# Patient Record
Sex: MALE | Race: WHITE | NOT HISPANIC OR LATINO | Employment: FULL TIME | URBAN - METROPOLITAN AREA
[De-identification: names, ages, dates, MRNs, and addresses within clinical notes are randomized per-mention and may not be internally consistent; named-entity substitution may affect disease eponyms.]

---

## 2017-06-02 ENCOUNTER — OFFICE VISIT (OUTPATIENT)
Dept: URGENT CARE | Facility: CLINIC | Age: 42
End: 2017-06-02
Payer: COMMERCIAL

## 2017-06-02 VITALS
HEART RATE: 82 BPM | OXYGEN SATURATION: 98 % | RESPIRATION RATE: 14 BRPM | DIASTOLIC BLOOD PRESSURE: 82 MMHG | TEMPERATURE: 98.2 F | SYSTOLIC BLOOD PRESSURE: 140 MMHG

## 2017-06-02 DIAGNOSIS — J34.89 SINUS PRESSURE: ICD-10-CM

## 2017-06-02 DIAGNOSIS — H10.13 ALLERGIC CONJUNCTIVITIS, BILATERAL: ICD-10-CM

## 2017-06-02 PROCEDURE — 99214 OFFICE O/P EST MOD 30 MIN: CPT | Performed by: PHYSICIAN ASSISTANT

## 2017-06-02 RX ORDER — AMOXICILLIN AND CLAVULANATE POTASSIUM 875; 125 MG/1; MG/1
1 TABLET, FILM COATED ORAL 2 TIMES DAILY
Qty: 14 TAB | Refills: 0 | Status: SHIPPED | OUTPATIENT
Start: 2017-06-02 | End: 2017-06-04

## 2017-06-02 RX ORDER — POLYMYXIN B SULFATE AND TRIMETHOPRIM 1; 10000 MG/ML; [USP'U]/ML
1 SOLUTION OPHTHALMIC EVERY 4 HOURS
Qty: 1 BOTTLE | Refills: 0 | Status: SHIPPED | OUTPATIENT
Start: 2017-06-02 | End: 2017-06-12

## 2017-06-02 RX ORDER — AMOXICILLIN AND CLAVULANATE POTASSIUM 875; 125 MG/1; MG/1
1 TABLET, FILM COATED ORAL 2 TIMES DAILY
Qty: 14 TAB | Refills: 0 | Status: SHIPPED | OUTPATIENT
Start: 2017-06-02 | End: 2017-06-02 | Stop reason: SDUPTHER

## 2017-06-02 ASSESSMENT — ENCOUNTER SYMPTOMS
CHILLS: 0
FEVER: 0
SINUS PRESSURE: 1
SHORTNESS OF BREATH: 0
SORE THROAT: 0
WHEEZING: 0
COUGH: 0
EYE REDNESS: 1
PALPITATIONS: 0
HEADACHES: 1

## 2017-06-02 NOTE — MR AVS SNAPSHOT
Chino Diaz   2017 8:00 AM   Office Visit   MRN: [id number]    Department:  [facility]   Dept Phone:  [phone]    Description:  Male : 1975   Provider:  Braulio Burkett PA-C           Reason for Visit     Sinus Problem head bloating/ pressure, red eyes X 5 days       Allergies as of 2017     No Known Allergies      You were diagnosed with     Sinus pressure   [888739]       Allergic conjunctivitis, bilateral   [226388]         Vital Signs     Blood Pressure Pulse Temperature Respirations Oxygen Saturation Smoking Status    140/82 mmHg 82 36.8 °C (98.2 °F) 14 98% Former Smoker      Basic Information     Date Of Birth Sex Race Ethnicity Preferred Language    1975 Male White Unknown English      Health Maintenance        Date Due Completion Dates    IMM DTaP/Tdap/Td Vaccine (1 - Tdap) 1992 ---            Current Immunizations     No immunizations on file.      Below and/or attached are the medications your provider expects you to take. Review all of your home medications and newly ordered medications with your provider and/or pharmacist. Follow medication instructions as directed by your provider and/or pharmacist. Please keep your medication list with you and share with your provider. Update the information when medications are discontinued, doses are changed, or new medications (including over-the-counter products) are added; and carry medication information at all times in the event of emergency situations     Allergies:  No Known Allergies          Medications  Valid as of: 2017 -  8:26 AM    Generic Name Brand Name Tablet Size Instructions for use    Amoxicillin-Pot Clavulanate (Tab) AUGMENTIN 875-125 MG Take 1 Tab by mouth 2 times a day for 7 days.        Gemfibrozil (Tab) LOPID 600 MG Take 600 mg by mouth 2 times a day.        Lisinopril (Tab) PRINIVIL 10 MG Take 5 mg by mouth every day.        MetFORMIN HCl (Tab) GLUCOPHAGE 500 MG Take 1,000 mg by  mouth 2 times a day, with meals.        Phenyleph-Promethazine-Cod (Syrup) PHENERGAN VC CODEINE 5-6.25-10 MG/5ML Take 5 mL by mouth every 6 hours as needed.        Polymyxin B-Trimethoprim (Solution) POLYTRIM 67054-7.1 UNIT/ML-% Place 1 Drop in both eyes every 4 hours for 10 days.        Sertraline HCl (Tab) ZOLOFT 50 MG Take 50 mg by mouth every day.        Simvastatin (Tab) ZOCOR 20 MG Take 20 mg by mouth every evening.        .                 Medicines prescribed today were sent to:     [facility][id number] - [place], [place] - [address]    [address] [place] [address]    Phone: [phone] Fax: [phone]    Open 24 Hours?: No      Medication refill instructions:       If your prescription bottle indicates you have medication refills left, it is not necessary to call your provider’s office. Please contact your pharmacy and they will refill your medication.    If your prescription bottle indicates you do not have any refills left, you may request refills at any time through one of the following ways: The online [employer] system (except Urgent Care), by calling your provider’s office, or by asking your pharmacy to contact your provider’s office with a refill request. Medication refills are processed only during regular business hours and may not be available until the next business day. Your provider may request additional information or to have a follow-up visit with you prior to refilling your medication.   *Please Note: Medication refills are assigned a new Rx number when refilled electronically. Your pharmacy may indicate that no refills were authorized even though a new prescription for the same medication is available at the pharmacy. Please request the medicine by name with the pharmacy before contacting your provider for a refill.           [employer] Access Code: [email or web address]  Expires: 7/2/2017  7:59 AM    [employer]  A secure, online tool to manage your health information          [employer]’s Canvas® is a secure, online tool that connects you to your personalized health information from the privacy of your home -- day or night - making it very easy for you to manage your healthcare. Once the activation process is completed, you can even access your medical information using the Canvas jillian, which is available for free in the Apple Jillian store or Google Play store.     Canvas provides the following levels of access (as shown below):   My Chart Features   [facility] Primary [facility] Doctor [facility]  Specialists [facility]  Urgent  Care Non-[facility]  Primary Care  Doctor   Email your healthcare team securely and privately 24/7 X X X    Manage appointments: schedule your next appointment; view details of past/upcoming appointments X      Request prescription refills. X      View recent personal medical records, including lab and immunizations X X X X   View health record, including health history, allergies, medications X X X X   Read reports about your outpatient visits, procedures, consult and ER notes X X X X   See your discharge summary, which is a recap of your hospital and/or ER visit that includes your diagnosis, lab results, and care plan. X X       How to register for Canvas:  1. Go to  [email or web address].  2. Click on the Sign Up Now box, which takes you to the New Member Sign Up page. You will need to provide the following information:  a. Enter your Canvas Access Code exactly as it appears at the top of this page. (You will not need to use this code after you’ve completed the sign-up process. If you do not sign up before the expiration date, you must request a new code.)   b. Enter your date of birth.   c. Enter your home email address.   d. Click Submit, and follow the next screen’s instructions.  3. Create a Canvas ID. This will be your Canvas login ID and cannot be changed, so think of one that is secure and easy to remember.  4. Create a Canvas password. You can change your  password at any time.  5. Enter your Password Reset Question and Answer. This can be used at a later time if you forget your password.   6. Enter your e-mail address. This allows you to receive e-mail notifications when new information is available in [employer].  7. Click Sign Up. You can now view your health information.    For assistance activating your [employer] account, call [phone]

## 2017-06-02 NOTE — PROGRESS NOTES
Subjective:      Chino Diaz is a 41 y.o. male who presents with Sinus Problem            Sinus Problem  This is a new problem. Episode onset: 5 days ago. The problem has been gradually worsening since onset. There has been no fever. The fever has been present for less than 1 day. The pain is moderate. Associated symptoms include congestion, headaches and sinus pressure. Pertinent negatives include no chills, coughing, ear pain, shortness of breath or sore throat. Past treatments include oral decongestants and saline sprays. The treatment provided mild relief.       Review of Systems   Constitutional: Negative for fever and chills.   HENT: Positive for congestion and sinus pressure. Negative for ear pain and sore throat.    Eyes: Positive for redness.   Respiratory: Negative for cough, shortness of breath and wheezing.    Cardiovascular: Negative for chest pain and palpitations.   Neurological: Positive for headaches.     All other systems reviewed and are negative.  PMH:  has a past medical history of Type II or unspecified type diabetes mellitus without mention of complication, not stated as uncontrolled; Depression; Hypertension; and Hyperlipidemia.  MEDS:   Current outpatient prescriptions:   •  polymixin-trimethoprim (POLYTRIM) 64552-0.1 UNIT/ML-% Solution, Place 1 Drop in both eyes every 4 hours for 10 days., Disp: 1 Bottle, Rfl: 0  •  amoxicillin-clavulanate (AUGMENTIN) 875-125 MG Tab, Take 1 Tab by mouth 2 times a day for 7 days., Disp: 14 Tab, Rfl: 0  •  sertraline (ZOLOFT) 50 MG TABS, Take 50 mg by mouth every day., Disp: , Rfl:   •  metformin (GLUCOPHAGE) 500 MG TABS, Take 1,000 mg by mouth 2 times a day, with meals., Disp: , Rfl:   •  simvastatin (ZOCOR) 20 MG TABS, Take 20 mg by mouth every evening., Disp: , Rfl:   •  gemfibrozil (LOPID) 600 MG TABS, Take 600 mg by mouth 2 times a day., Disp: , Rfl:   •  prometh-phenylephrine-codeine (PHENERGAN VC CODEINE) 5-6.25-10 MG/5ML SYRP, Take 5 mL by mouth  every 6 hours as needed., Disp: 150 mL, Rfl: 0  •  lisinopril (PRINIVIL) 10 MG TABS, Take 5 mg by mouth every day., Disp: , Rfl:   ALLERGIES: No Known Allergies  SURGHX:   Past Surgical History   Procedure Laterality Date   • Tonsillectomy       SOCHX:  reports that he quit smoking about 11 years ago. His smoking use included Cigarettes. He does not have any smokeless tobacco history on file. He reports that he drinks alcohol. He reports that he does not use illicit drugs.  FH: Family history was reviewed, no pertinent findings to report  Medications, Allergies, and current problem list reviewed today in Epic         Objective:     /82 mmHg  Pulse 82  Temp(Src) 36.8 °C (98.2 °F)  Resp 14  SpO2 98%     Physical Exam   Constitutional: He is oriented to person, place, and time. Vital signs are normal. He appears well-developed and well-nourished.   HENT:   Head: Normocephalic and atraumatic.   Right Ear: Hearing, tympanic membrane, external ear and ear canal normal.   Left Ear: Hearing, tympanic membrane, external ear and ear canal normal.   Nose: Mucosal edema and rhinorrhea present. No sinus tenderness. No epistaxis. Right sinus exhibits no maxillary sinus tenderness. Left sinus exhibits no maxillary sinus tenderness.   Mouth/Throat: Uvula is midline, oropharynx is clear and moist and mucous membranes are normal.   Neck: Normal range of motion. Neck supple.   Cardiovascular: Normal rate, regular rhythm, normal heart sounds and intact distal pulses.    Pulmonary/Chest: Effort normal and breath sounds normal.   Neurological: He is alert and oriented to person, place, and time.   Skin: Skin is warm and dry.   Psychiatric: He has a normal mood and affect. His behavior is normal.   Vitals reviewed.              Assessment/Plan:   Sinus pressure most likely due to allergy.    1. Sinus pressure  polymixin-trimethoprim (POLYTRIM) 31255-5.1 UNIT/ML-% Solution    amoxicillin-clavulanate (AUGMENTIN) 875-125 MG Tab     DISCONTINUED: amoxicillin-clavulanate (AUGMENTIN) 875-125 MG Tab   2. Allergic conjunctivitis, bilateral       Cont.  Allergy OTC    Contingent antibiotics prescription given to patient to fill upon meeting criteria of guidelines discussed.     Differential diagnosis, natural history, supportive care, and indications for immediate follow-up discussed at length.   Follow-up with primary care provider within 4-5 days, emergency room precautions discussed.  Patient and/or family appears understanding of information.

## 2017-06-04 ENCOUNTER — APPOINTMENT (OUTPATIENT)
Dept: RADIOLOGY | Facility: MEDICAL CENTER | Age: 42
End: 2017-06-04
Attending: EMERGENCY MEDICINE
Payer: COMMERCIAL

## 2017-06-04 ENCOUNTER — OFFICE VISIT (OUTPATIENT)
Dept: URGENT CARE | Facility: CLINIC | Age: 42
End: 2017-06-04
Payer: COMMERCIAL

## 2017-06-04 ENCOUNTER — HOSPITAL ENCOUNTER (EMERGENCY)
Facility: MEDICAL CENTER | Age: 42
End: 2017-06-04
Attending: EMERGENCY MEDICINE
Payer: COMMERCIAL

## 2017-06-04 VITALS
BODY MASS INDEX: 25.56 KG/M2 | DIASTOLIC BLOOD PRESSURE: 90 MMHG | HEART RATE: 91 BPM | HEIGHT: 70 IN | RESPIRATION RATE: 16 BRPM | SYSTOLIC BLOOD PRESSURE: 155 MMHG | WEIGHT: 178.57 LBS | TEMPERATURE: 97.6 F | OXYGEN SATURATION: 98 %

## 2017-06-04 VITALS
HEIGHT: 70 IN | DIASTOLIC BLOOD PRESSURE: 86 MMHG | SYSTOLIC BLOOD PRESSURE: 130 MMHG | WEIGHT: 178 LBS | OXYGEN SATURATION: 96 % | RESPIRATION RATE: 20 BRPM | BODY MASS INDEX: 25.48 KG/M2 | TEMPERATURE: 96.3 F | HEART RATE: 88 BPM

## 2017-06-04 DIAGNOSIS — J34.89 SINUS PRESSURE: ICD-10-CM

## 2017-06-04 DIAGNOSIS — J30.89 NON-SEASONAL ALLERGIC RHINITIS, UNSPECIFIED ALLERGIC RHINITIS TRIGGER: ICD-10-CM

## 2017-06-04 DIAGNOSIS — G51.0 BELL'S PALSY: ICD-10-CM

## 2017-06-04 DIAGNOSIS — R11.2 NON-INTRACTABLE VOMITING WITH NAUSEA, UNSPECIFIED VOMITING TYPE: ICD-10-CM

## 2017-06-04 DIAGNOSIS — G44.209 ACUTE NON INTRACTABLE TENSION-TYPE HEADACHE: ICD-10-CM

## 2017-06-04 DIAGNOSIS — R73.9 HYPERGLYCEMIA: ICD-10-CM

## 2017-06-04 LAB
ALBUMIN SERPL BCP-MCNC: 5 G/DL (ref 3.2–4.9)
ALBUMIN/GLOB SERPL: 1.6 G/DL
ALP SERPL-CCNC: 112 U/L (ref 30–99)
ALT SERPL-CCNC: 28 U/L (ref 2–50)
ANION GAP SERPL CALC-SCNC: 22 MMOL/L (ref 0–11.9)
AST SERPL-CCNC: 13 U/L (ref 12–45)
BASOPHILS # BLD AUTO: 1 % (ref 0–1.8)
BASOPHILS # BLD: 0.15 K/UL (ref 0–0.12)
BILIRUB SERPL-MCNC: 0.6 MG/DL (ref 0.1–1.5)
BUN SERPL-MCNC: 18 MG/DL (ref 8–22)
CALCIUM SERPL-MCNC: 11 MG/DL (ref 8.5–10.5)
CHLORIDE SERPL-SCNC: 103 MMOL/L (ref 96–112)
CO2 SERPL-SCNC: 11 MMOL/L (ref 20–33)
CREAT SERPL-MCNC: 1.1 MG/DL (ref 0.5–1.4)
EOSINOPHIL # BLD AUTO: 0.02 K/UL (ref 0–0.51)
EOSINOPHIL NFR BLD: 0.1 % (ref 0–6.9)
ERYTHROCYTE [DISTWIDTH] IN BLOOD BY AUTOMATED COUNT: 50.1 FL (ref 35.9–50)
GFR SERPL CREATININE-BSD FRML MDRD: >60 ML/MIN/1.73 M 2
GLOBULIN SER CALC-MCNC: 3.1 G/DL (ref 1.9–3.5)
GLUCOSE BLD-MCNC: 264 MG/DL (ref 65–99)
GLUCOSE BLD-MCNC: 311 MG/DL (ref 70–100)
GLUCOSE BLD-MCNC: 339 MG/DL (ref 65–99)
GLUCOSE SERPL-MCNC: 364 MG/DL (ref 65–99)
HCT VFR BLD AUTO: 53.8 % (ref 42–52)
HGB BLD-MCNC: 18.3 G/DL (ref 14–18)
IMM GRANULOCYTES # BLD AUTO: 0.21 K/UL (ref 0–0.11)
IMM GRANULOCYTES NFR BLD AUTO: 1.4 % (ref 0–0.9)
LIPASE SERPL-CCNC: 35 U/L (ref 11–82)
LYMPHOCYTES # BLD AUTO: 1.59 K/UL (ref 1–4.8)
LYMPHOCYTES NFR BLD: 10.7 % (ref 22–41)
MCH RBC QN AUTO: 31.1 PG (ref 27–33)
MCHC RBC AUTO-ENTMCNC: 34 G/DL (ref 33.7–35.3)
MCV RBC AUTO: 91.5 FL (ref 81.4–97.8)
MONOCYTES # BLD AUTO: 0.6 K/UL (ref 0–0.85)
MONOCYTES NFR BLD AUTO: 4 % (ref 0–13.4)
NEUTROPHILS # BLD AUTO: 12.3 K/UL (ref 1.82–7.42)
NEUTROPHILS NFR BLD: 82.8 % (ref 44–72)
NRBC # BLD AUTO: 0 K/UL
NRBC BLD AUTO-RTO: 0 /100 WBC
PLATELET # BLD AUTO: 443 K/UL (ref 164–446)
PMV BLD AUTO: 9.3 FL (ref 9–12.9)
POTASSIUM SERPL-SCNC: 4.9 MMOL/L (ref 3.6–5.5)
PROT SERPL-MCNC: 8.1 G/DL (ref 6–8.2)
RBC # BLD AUTO: 5.88 M/UL (ref 4.7–6.1)
SODIUM SERPL-SCNC: 136 MMOL/L (ref 135–145)
WBC # BLD AUTO: 14.9 K/UL (ref 4.8–10.8)

## 2017-06-04 PROCEDURE — 700111 HCHG RX REV CODE 636 W/ 250 OVERRIDE (IP): Performed by: EMERGENCY MEDICINE

## 2017-06-04 PROCEDURE — 700102 HCHG RX REV CODE 250 W/ 637 OVERRIDE(OP): Performed by: EMERGENCY MEDICINE

## 2017-06-04 PROCEDURE — 96365 THER/PROPH/DIAG IV INF INIT: CPT

## 2017-06-04 PROCEDURE — 96361 HYDRATE IV INFUSION ADD-ON: CPT

## 2017-06-04 PROCEDURE — 70450 CT HEAD/BRAIN W/O DYE: CPT

## 2017-06-04 PROCEDURE — 99215 OFFICE O/P EST HI 40 MIN: CPT | Performed by: NURSE PRACTITIONER

## 2017-06-04 PROCEDURE — 83690 ASSAY OF LIPASE: CPT

## 2017-06-04 PROCEDURE — 99285 EMERGENCY DEPT VISIT HI MDM: CPT

## 2017-06-04 PROCEDURE — 700105 HCHG RX REV CODE 258: Performed by: EMERGENCY MEDICINE

## 2017-06-04 PROCEDURE — 96375 TX/PRO/DX INJ NEW DRUG ADDON: CPT

## 2017-06-04 PROCEDURE — 80053 COMPREHEN METABOLIC PANEL: CPT

## 2017-06-04 PROCEDURE — 96372 THER/PROPH/DIAG INJ SC/IM: CPT

## 2017-06-04 PROCEDURE — 85025 COMPLETE CBC W/AUTO DIFF WBC: CPT

## 2017-06-04 PROCEDURE — A9270 NON-COVERED ITEM OR SERVICE: HCPCS | Performed by: EMERGENCY MEDICINE

## 2017-06-04 PROCEDURE — 82962 GLUCOSE BLOOD TEST: CPT

## 2017-06-04 RX ORDER — KETOROLAC TROMETHAMINE 30 MG/ML
60 INJECTION, SOLUTION INTRAMUSCULAR; INTRAVENOUS ONCE
Status: CANCELLED | OUTPATIENT
Start: 2017-06-04 | End: 2017-06-04

## 2017-06-04 RX ORDER — DEXAMETHASONE SODIUM PHOSPHATE 4 MG/ML
10 INJECTION, SOLUTION INTRA-ARTICULAR; INTRALESIONAL; INTRAMUSCULAR; INTRAVENOUS; SOFT TISSUE ONCE
Status: COMPLETED | OUTPATIENT
Start: 2017-06-04 | End: 2017-06-04

## 2017-06-04 RX ORDER — ONDANSETRON 4 MG/1
4 TABLET, ORALLY DISINTEGRATING ORAL EVERY 8 HOURS PRN
Qty: 10 TAB | Refills: 0 | Status: CANCELLED | OUTPATIENT
Start: 2017-06-04

## 2017-06-04 RX ORDER — ACYCLOVIR 800 MG/1
800 TABLET ORAL ONCE
Status: COMPLETED | OUTPATIENT
Start: 2017-06-04 | End: 2017-06-04

## 2017-06-04 RX ORDER — ONDANSETRON 2 MG/ML
4 INJECTION INTRAMUSCULAR; INTRAVENOUS ONCE
Status: COMPLETED | OUTPATIENT
Start: 2017-06-04 | End: 2017-06-04

## 2017-06-04 RX ORDER — PROMETHAZINE HYDROCHLORIDE 25 MG/1
25 TABLET ORAL EVERY 6 HOURS PRN
Qty: 16 TAB | Refills: 0 | Status: SHIPPED | OUTPATIENT
Start: 2017-06-04 | End: 2017-12-26

## 2017-06-04 RX ORDER — ONDANSETRON 4 MG/1
4 TABLET, ORALLY DISINTEGRATING ORAL ONCE
Status: CANCELLED | OUTPATIENT
Start: 2017-06-04 | End: 2017-06-04

## 2017-06-04 RX ORDER — VALACYCLOVIR HYDROCHLORIDE 500 MG/1
1000 TABLET, FILM COATED ORAL 2 TIMES DAILY
Qty: 20 TAB | Refills: 0 | Status: SHIPPED | OUTPATIENT
Start: 2017-06-04 | End: 2017-06-09

## 2017-06-04 RX ORDER — MINERAL OIL, PETROLATUM 425; 568 MG/G; MG/G
1 OINTMENT OPHTHALMIC
Qty: 1 TUBE | Refills: 1 | Status: SHIPPED | OUTPATIENT
Start: 2017-06-04 | End: 2019-03-12

## 2017-06-04 RX ORDER — AMOXICILLIN 500 MG/1
500 CAPSULE ORAL 3 TIMES DAILY
Qty: 30 CAP | Refills: 0 | Status: SHIPPED | OUTPATIENT
Start: 2017-06-04 | End: 2017-12-26

## 2017-06-04 RX ORDER — SODIUM CHLORIDE 9 MG/ML
2000 INJECTION, SOLUTION INTRAVENOUS ONCE
Status: COMPLETED | OUTPATIENT
Start: 2017-06-04 | End: 2017-06-04

## 2017-06-04 RX ORDER — CEFTRIAXONE 2 G/1
2 INJECTION, POWDER, FOR SOLUTION INTRAMUSCULAR; INTRAVENOUS ONCE
Status: COMPLETED | OUTPATIENT
Start: 2017-06-04 | End: 2017-06-04

## 2017-06-04 RX ORDER — PREDNISONE 20 MG/1
40 TABLET ORAL DAILY
Qty: 6 TAB | Refills: 0 | Status: SHIPPED | OUTPATIENT
Start: 2017-06-04 | End: 2017-06-07

## 2017-06-04 RX ORDER — LISINOPRIL 10 MG/1
10 TABLET ORAL DAILY
Qty: 30 TAB | Refills: 0 | Status: SHIPPED | OUTPATIENT
Start: 2017-06-04 | End: 2018-01-02 | Stop reason: SDUPTHER

## 2017-06-04 RX ADMIN — ACYCLOVIR 800 MG: 800 TABLET ORAL at 15:28

## 2017-06-04 RX ADMIN — CEFTRIAXONE 2 G: 2 INJECTION, POWDER, FOR SOLUTION INTRAMUSCULAR; INTRAVENOUS at 15:14

## 2017-06-04 RX ADMIN — DEXAMETHASONE SODIUM PHOSPHATE 10 MG: 4 INJECTION, SOLUTION INTRAMUSCULAR; INTRAVENOUS at 15:28

## 2017-06-04 RX ADMIN — INSULIN HUMAN 5 UNITS: 100 INJECTION, SOLUTION PARENTERAL at 13:19

## 2017-06-04 RX ADMIN — SODIUM CHLORIDE 2000 ML: 9 INJECTION, SOLUTION INTRAVENOUS at 13:19

## 2017-06-04 RX ADMIN — ONDANSETRON 4 MG: 2 INJECTION INTRAMUSCULAR; INTRAVENOUS at 13:19

## 2017-06-04 ASSESSMENT — ENCOUNTER SYMPTOMS
ABDOMINAL PAIN: 0
PALPITATIONS: 0
FOCAL WEAKNESS: 0
WEAKNESS: 0
PHOTOPHOBIA: 0
EYE DISCHARGE: 0
SORE THROAT: 0
ORTHOPNEA: 0
DIZZINESS: 0
DOUBLE VISION: 0
SENSORY CHANGE: 0
MYALGIAS: 0
WHEEZING: 0
DIARRHEA: 0
EYE REDNESS: 0
NUMBER OF EPISODES OF EMESIS TODAY: 1
NAUSEA: 1
FEVER: 0
BLURRED VISION: 0
SPEECH CHANGE: 0
HEADACHES: 1
CHILLS: 0
SHORTNESS OF BREATH: 0
COUGH: 0
CONSTIPATION: 0
VOMITING: 1
TINGLING: 0

## 2017-06-04 ASSESSMENT — PAIN SCALES - GENERAL: PAINLEVEL_OUTOF10: 7

## 2017-06-04 NOTE — ED PROVIDER NOTES
ER Provider Note     Scribed for Ayo Kelley M.D. by Carley Gabriel. 6/4/2017, 12:13 PM.    Primary Care Provider: Regi Nolan M.D.  Means of Arrival: Walk in   History obtained from: Patient  History limited by: None     CHIEF COMPLAINT   Chief Complaint   Patient presents with   • Headache       HPI   Chino Diaz is a 41 y.o. who presents to the emergency department for a headache with an onset of 4 days.  Symptoms developed six days ago with photophobia and sinus pressure.  He was placed on Augmentin after he was evaluated on 06/02/17.  He experienced a sinus headache four days ago which was associated with photophobia, eye redness and sinus pressure.  Headache has remained constant since onset.  He was evaluated at an urgent care for this complaint and was asked to continue use of Claritin, Sinex nasal spray and Visine eye drops.  He began vomiting yesterday and reports multiple episodes.  He is able to tolerate fluids.  He was evaluated in the urgent care today and was noted to have hyperglycemia at 311. History of diabetes and hypertension.  Patient has been noncompliant for two months with all of his medications including Lisinopril and Metformin secondary to insurance issues.  Associated symptoms include nausea.  He denies fevers, ear pain, vision changes, cough, shortness of breath, abdominal pain, diarrhea or focal weakness.       REVIEW OF SYSTEMS   General: No fever or chills.  Eyes: Positive photophobia and eye redness.  No vision changes.  Ear nose throat: Positive sinus pressure.  No sore throat or  trouble swallowing. No ear pain, runny nose or congestion.  Pulmonary: No shortness of breath or cough.  Cardiovascular: No chest pain or chest pressure.  GI: Positive nausea and vomiting.  No abdominal pain or diarrhea.  : No dysuria or hematuria  Dermatologic: No rashes. No abrasions.  Neurologic: Positive sinus headache.  No focal weakness.  Psychiatric: No anxiety or  "stress.  Endocrine:  Positive hyperglycemia.    All other systems reviewed and are negative.  C.      PAST MEDICAL HISTORY  Past Medical History   Diagnosis Date   • Type II or unspecified type diabetes mellitus without mention of complication, not stated as uncontrolled    • Depression    • Hypertension    • Hyperlipidemia        SURGICAL HISTORY  Past Surgical History   Procedure Laterality Date   • Tonsillectomy         SOCIAL HISTORY  Social History   Substance Use Topics   • Smoking status: Former Smoker     Types: Cigarettes     Quit date: 01/01/2006   • Smokeless tobacco: None   • Alcohol Use: Yes      Comment: 1-2x weekly       CURRENT MEDICATIONS  Previous Medications    GEMFIBROZIL (LOPID) 600 MG TABS    Take 600 mg by mouth 2 times a day.    LISINOPRIL (PRINIVIL) 10 MG TABS    Take 5 mg by mouth every day.    METFORMIN (GLUCOPHAGE) 500 MG TABS    Take 1,000 mg by mouth 2 times a day, with meals.    POLYMIXIN-TRIMETHOPRIM (POLYTRIM) 67618-5.1 UNIT/ML-% SOLUTION    Place 1 Drop in both eyes every 4 hours for 10 days.    PROMETH-PHENYLEPHRINE-CODEINE (PHENERGAN VC CODEINE) 5-6.25-10 MG/5ML SYRP    Take 5 mL by mouth every 6 hours as needed.    SERTRALINE (ZOLOFT) 50 MG TABS    Take 50 mg by mouth every day.    SIMVASTATIN (ZOCOR) 20 MG TABS    Take 20 mg by mouth every evening.   Patient reports use of Augmentin, Claritin, Sinex nasal spray and Visine eye drops.  Patient has been noncompliant for two months with all of his medications including Lisinopril and Metformin.      ALLERGIES   None      PHYSICAL EXAM   Vital Signs: /90 mmHg  Pulse 116  Temp(Src) 36.4 °C (97.6 °F)  Resp 16  Ht 1.778 m (5' 10\")  Wt 81 kg (178 lb 9.2 oz)  BMI 25.62 kg/m2  SpO2 97%      Constitutional: Well developed, Well nourished, No acute distress, Non-toxic appearance.   Psychiatric: Calm. Not anxious.  HENT:  Oropharynx: no exudate no erythema  Eyes: PERRLA, EOMI, Conjunctiva normal, No discharge.   Musculoskeletal: " Neck is soft and supple no meningismus  Lymphatic: No cervical lymphadenopathy noted.   Cardiovascular: Normal heart rate, Normal rhythm, No murmurs, Negative Homans, no pedal edema, good equal pedal pulses.  Pulmonary: Lungs are clear to auscultation bilaterally. No wheezes rales or rhonchi  Abdomen: Bowel sounds normal, soft nondistended and nontender. No rebound and no guarding .  Skin: Warm, Dry, No erythema, No rash.   : No CVA tenderness.   Neurologic: Alert & oriented, moves all extremities normally. Good sensation to light touch on all extremities. Cranial nerves II-XII are grossly intact except cranial nerve VIII with facial palsy on left side with forehead involved.        DIAGNOSTIC STUDIES/PROCEDURES  Labs:   Results for orders placed or performed during the hospital encounter of 06/04/17   CBC WITH DIFFERENTIAL   Result Value Ref Range    WBC 14.9 (H) 4.8 - 10.8 K/uL    RBC 5.88 4.70 - 6.10 M/uL    Hemoglobin 18.3 (H) 14.0 - 18.0 g/dL    Hematocrit 53.8 (H) 42.0 - 52.0 %    MCV 91.5 81.4 - 97.8 fL    MCH 31.1 27.0 - 33.0 pg    MCHC 34.0 33.7 - 35.3 g/dL    RDW 50.1 (H) 35.9 - 50.0 fL    Platelet Count 443 164 - 446 K/uL    MPV 9.3 9.0 - 12.9 fL    Neutrophils-Polys 82.80 (H) 44.00 - 72.00 %    Lymphocytes 10.70 (L) 22.00 - 41.00 %    Monocytes 4.00 0.00 - 13.40 %    Eosinophils 0.10 0.00 - 6.90 %    Basophils 1.00 0.00 - 1.80 %    Immature Granulocytes 1.40 (H) 0.00 - 0.90 %    Nucleated RBC 0.00 /100 WBC    Neutrophils (Absolute) 12.30 (H) 1.82 - 7.42 K/uL    Lymphs (Absolute) 1.59 1.00 - 4.80 K/uL    Monos (Absolute) 0.60 0.00 - 0.85 K/uL    Eos (Absolute) 0.02 0.00 - 0.51 K/uL    Baso (Absolute) 0.15 (H) 0.00 - 0.12 K/uL    Immature Granulocytes (abs) 0.21 (H) 0.00 - 0.11 K/uL    NRBC (Absolute) 0.00 K/uL   COMP METABOLIC PANEL   Result Value Ref Range    Potassium 4.9 3.6 - 5.5 mmol/L    Sodium 136 135 - 145 mmol/L    Chloride 103 96 - 112 mmol/L    Co2 11 (L) 20 - 33 mmol/L    Anion Gap 22.0 (H)  0.0 - 11.9    Glucose 364 (H) 65 - 99 mg/dL    Bun 18 8 - 22 mg/dL    Creatinine 1.10 0.50 - 1.40 mg/dL    Calcium 11.0 (H) 8.5 - 10.5 mg/dL    AST(SGOT) 13 12 - 45 U/L    ALT(SGPT) 28 2 - 50 U/L    Alkaline Phosphatase 112 (H) 30 - 99 U/L    Total Bilirubin 0.6 0.1 - 1.5 mg/dL    Albumin 5.0 (H) 3.2 - 4.9 g/dL    Total Protein 8.1 6.0 - 8.2 g/dL    Globulin 3.1 1.9 - 3.5 g/dL    A-G Ratio 1.6 g/dL   LIPASE   Result Value Ref Range    Lipase 35 11 - 82 U/L   ESTIMATED GFR   Result Value Ref Range    GFR If African American >60 >60 mL/min/1.73 m 2    GFR If Non African American >60 >60 mL/min/1.73 m 2   ACCU-CHEK GLUCOSE   Result Value Ref Range    Glucose - Accu-Ck 339 (H) 65 - 99 mg/dL   ACCU-CHEK GLUCOSE   Result Value Ref Range    Glucose - Accu-Ck 264 (H) 65 - 99 mg/dL      All labs reviewed by me.        Radiology:   CT-HEAD W/O   Final Result      No acute intracranial abnormality is identified.      Trace fluid in the mastoid air cells on the left.        The radiologist's interpretation of all radiological studies have been reviewed by me.      COURSE & MEDICAL DECISION MAKING   Pertinent Labs & Imaging studies reviewed. (See chart for details). The patient is here with obvious Bell's palsy it's interesting that he does not recall it sounds like he had a recent viral sinusitis however being a diabetic we need to worry about bacterial infection. He is also been noncompliant as medications.    12:18 PM Reviewed patient's electronic medical record which indicates an office visit on 06/02/17 for sinus problem and was placed on Augmentin.  Patient was evaluated earlier today for congestion, headache, nausea and vomiting.  POC glucose was noted at 311.  Patient denies taking oral diabetic medication for 3 months due to financial and insurance problems.    12:21 PM Patient seen and examined at bedside. Patient presents for a headache.  Exam indicates a normal neurological exam with the exception of cranial nerve  VIII with facial palsy on left side with forehead involved.      Initial orders in the Emergency Department included CT head and laboratory testing: lipase, CBC with differential, CMP, estimated GFR and Accu-Chek glucose.  Initial treatment in the Emergency Department included 5 units of Humulin R subcutaneous and 4 mg of Zofran IV.  Patient will be hydrated with 2 L of NS IV.  Patient verbalized their understanding and agreement to this plan.    3:06 PM Accu-chek glucose was ordered and resulted at 264.    3:09 PM Patient will be treated with 10 mg of Decadron IV, 800 mg of Acyclovir IV and 2 g of Rocephin IV.    4:03 PM On repeat evaluation, patient is doing well.     4:36 PM CT results and lab results were relayed to the patient as noted above.  Eye patch will be given to him prior to discharge.      Leukocyte is improved to the 200s. Patient asked he feels much better has not been vomiting. At this point received IV fluids due to dehydration and vomiting and tolerated well and is responded well with moist oral mucosa and no longer vomiting. Patient at this point be discharged home on putting on amoxicillin for the possible mastoid fluid and the patient may have some residual infection I think would benefit from antibiotics in addition we'll put him on Decadron will put him back on his metformin in addition his blood pressure medication he will follow-up with his primary care doctor when he establishes long he will also return ER if he is not getting better in a couple days or gets worse. We talked about most importantly about keeping the eye covered or moist with drops or Lacri-Lube or eye guard as he may, if not properly followed, could develop corneal abrasion corneal ulcer and loss of vision. Patient verbalized understanding all these parts.    Discharge plan was discussed with the patient and includes following up with Dr. Nolan.  Patient will be discharged with a prescription for Phenergan, Deltasone,  "Valtrex, Amoxil, Lacri-lube, Tears Naturale Free, Metformin and Lisinopril.  Patient is aware that both the Lisinopril and Metformin prescriptions were sent to his pharmacy.       The patient will return for new or persisting symptoms including eye pain, weakness or headache.  The patient verbalizes understanding and will comply.  Patient is stable at the time of discharge.  Vital signs were reviewed: /90 mmHg  Pulse 95  Temp(Src) 36.4 °C (97.6 °F)  Resp 16  Ht 1.778 m (5' 10\")  Wt 81 kg (178 lb 9.2 oz)  BMI 25.62 kg/m2  SpO2 97%       DISPOSITION  Patient will be discharged home in stable condition.      FOLLOW UP  Regi Nolan M.D.  [address] [place] [address] [address]  [facility] [address]  [phone]      [facility], Emergency Dept  [address] [facility] [address] [phone]    If symptoms worsen      The patient is referred to a primary physician for blood pressure management, diabetic screening, and for all other preventative health concerns.      OUTPATIENT MEDICATIONS  New Prescriptions    AMOXICILLIN (AMOXIL) 500 MG CAP    Take 1 Cap by mouth 3 times a day.    ARTIFICIAL TEAR SOLUTION (TEARS NATURALE FREE) 0.1-0.3 % SOLUTION    Apply in the left eye every 1-2 drops while awake    ARTIFICIAL TEARS (LACRI-LUBE) OINTMENT OPHTHALMIC OINTMENT    Place 1 Inch in left eye every bedtime. For 3 weeks    LISINOPRIL (PRINIVIL) 10 MG TAB    Take 1 Tab by mouth every day.    METFORMIN (GLUCOPHAGE) 1000 MG TABLET    Take 1 Tab by mouth 2 times a day, with meals.    PREDNISONE (DELTASONE) 20 MG TAB    Take 2 Tabs by mouth every day for 3 days.    PROMETHAZINE (PHENERGAN) 25 MG TAB    Take 1 Tab by mouth every 6 hours as needed for Nausea/Vomiting.    VALACYCLOVIR (VALTREX) 500 MG TAB    Take 2 Tabs by mouth 2 times a day for 5 days.       DIAGNOSIS  1. Hyperglycemia    2. Bell's palsy     3. Med refill  The note accurately reflects work and decisions made by me.  Ayo Kelley  " 6/4/2017  6:51 PM     ICarley (Scribe), am scribing for, and in the presence of, Ayo Kelley M.D.    Electronically signed by: Carley Gabriel (Scribe), 6/4/2017    Ayo VEE M.D. personally performed the services described in this documentation, as scribed by Carley Gabriel in my presence, and it is both accurate and complete.

## 2017-06-04 NOTE — ED AVS SNAPSHOT
Home Care Instructions                                                                                                                Chino Diaz   MRN: [id number]    Department:  [facility], Emergency Dept   Date of Visit:  6/4/2017            [facility], Emergency Dept    [address] [address]    Phone:  [phone]      You were seen by     Ayo Kelley M.D.      Your Diagnosis Was     Hyperglycemia     R73.9       These are the medications you received during your hospitalization from 06/04/2017 1112 to 06/04/2017 1633     Date/Time Order Dose Route Action    06/04/2017 1319 NS infusion 2,000 mL 2,000 mL Intravenous New Bag    06/04/2017 1319 ondansetron (ZOFRAN) syringe/vial injection 4 mg 4 mg Intravenous Given    06/04/2017 1319 insulin regular (HUMULIN R) injection 5 Units 5 Units Subcutaneous Given    06/04/2017 1528 dexamethasone (DECADRON) injection 10 mg 10 mg Intravenous Given    06/04/2017 1528 acyclovir (ZOVIRAX) tablet 800 mg 800 mg Oral Given    06/04/2017 1514 cefTRIAXone (ROCEPHIN) injection 2 g 2 g Intravenous Given      Follow-up Information     1. Follow up with Regi Nolan M.D..    Specialty:  Internal Medicine    Contact information    [address]  [facility] [address] [phone]          2. Follow up with [facility], Emergency Dept.    Specialty:  Emergency Medicine    Why:  If symptoms worsen    Contact information    [address] [place] [address] [phone]      Medication Information     Review all of your home medications and newly ordered medications with your primary doctor and/or pharmacist as soon as possible. Follow medication instructions as directed by your doctor and/or pharmacist.     Please keep your complete medication list with you and share with your physician. Update the information when medications are discontinued, doses are changed, or new medications  (including over-the-counter products) are added; and carry medication information at all times in the event of emergency situations.               Medication List      START taking these medications        Instructions    Morning Afternoon Evening Bedtime    amoxicillin 500 MG Caps   Commonly known as:  AMOXIL        Take 1 Cap by mouth 3 times a day.   Dose:  500 mg                        artificial tears Oint ophthalmic ointment        Place 1 Inch in left eye every bedtime. For 3 weeks   Dose:  1 Inch                        predniSONE 20 MG Tabs   Commonly known as:  DELTASONE        Take 2 Tabs by mouth every day for 3 days.   Dose:  40 mg                        TEARS NATURALE FREE 0.1-0.3 % Soln        Apply in the left eye every 1-2 drops while awake                        valacyclovir 500 MG Tabs   Commonly known as:  VALTREX        Take 2 Tabs by mouth 2 times a day for 5 days.   Dose:  1000 mg                          ASK your doctor about these medications        Instructions    Morning Afternoon Evening Bedtime    * lisinopril 10 MG Tabs   What changed:  You were already taking a medication with the same name, and this prescription was added. Make sure you understand how and when to take each.   Commonly known as:  PRINIVIL   Ask about: Which instructions should I use?        Take 1 Tab by mouth every day.   Dose:  10 mg                        * lisinopril 10 MG Tabs   What changed:  Another medication with the same name was added. Make sure you understand how and when to take each.   Commonly known as:  PRINIVIL   Ask about: Which instructions should I use?        Take 5 mg by mouth every day.   Dose:  5 mg                        LOPID 600 MG Tabs   Generic drug:  gemfibrozil        Take 600 mg by mouth 2 times a day.   Dose:  600 mg                        * metformin 1000 MG tablet   What changed:  You were already taking a medication with the same name, and this prescription was added. Make sure you  understand how and when to take each.   Commonly known as:  GLUCOPHAGE   Ask about: Which instructions should I use?        Take 1 Tab by mouth 2 times a day, with meals.   Dose:  1000 mg                        * metformin 500 MG Tabs   What changed:  Another medication with the same name was added. Make sure you understand how and when to take each.   Commonly known as:  GLUCOPHAGE   Ask about: Which instructions should I use?        Take 1,000 mg by mouth 2 times a day, with meals.   Dose:  1000 mg                        polymixin-trimethoprim 74881-3.1 UNIT/ML-% Soln   Commonly known as:  POLYTRIM        Place 1 Drop in both eyes every 4 hours for 10 days.   Dose:  1 Drop                        prometh-phenylephrine-codeine 5-6.25-10 MG/5ML Syrp   Commonly known as:  PHENERGAN VC CODEINE        Take 5 mL by mouth every 6 hours as needed.   Dose:  5 mL                        simvastatin 20 MG Tabs   Commonly known as:  ZOCOR        Take 20 mg by mouth every evening.   Dose:  20 mg                        ZOLOFT 50 MG Tabs   Generic drug:  sertraline        Take 50 mg by mouth every day.   Dose:  50 mg                        * Notice:  This list has 4 medication(s) that are the same as other medications prescribed for you. Read the directions carefully, and ask your doctor or other care provider to review them with you.         Where to Get Your Medications      These medications were sent to [facility]/PHARMACY #[id number] - [place] - [address] [place] [address] AT [place] [address] [address]     Phone:  [phone]    - lisinopril 10 MG Tabs  - metformin 1000 MG tablet      You can get these medications from any pharmacy     Bring a paper prescription for each of these medications    - amoxicillin 500 MG Caps  - artificial tears Oint ophthalmic ointment  - predniSONE 20 MG Tabs  - TEARS NATURALE FREE 0.1-0.3 % Soln  - valacyclovir 500 MG Tabs            Procedures and tests performed during your visit      Procedure/Test Number of Times Performed    ACCU-CHEK GLUCOSE 2    CBC WITH DIFFERENTIAL 1    COMP METABOLIC PANEL 1    CT-HEAD W/O 1    ESTIMATED GFR 1    LIPASE 1    NURSING COMMUNICATION 1        Discharge Instructions       Please follow up with a primary physician for blood pressure management, diabetic screening, and all other preventive health concerns.      Bell Palsy  Bell palsy is a condition in which the muscles on one side of the face become paralyzed. This often causes one side of the face to droop. It is a common condition and most people recover completely.  RISK FACTORS  Risk factors for Bell palsy include:  · Pregnancy.  · Diabetes.  · An infection by a virus, such as infections that cause cold sores.  CAUSES   Bell palsy is caused by damage to or inflammation of a nerve in your face. It is unclear why this happens, but an infection by a virus may lead to it. Most of the time the reason it happens is unknown.  SIGNS AND SYMPTOMS   Symptoms can range from mild to severe and can take place over a number of hours. Symptoms may include:  · Being unable to:  ¨ Raise one or both eyebrows.  ¨ Close one or both eyes.  ¨ Feel parts of your face (facial numbness).  · Drooping of the eyelid and corner of the mouth.  · Weakness in the face.  · Paralysis of half your face.  · Loss of taste.  · Sensitivity to loud noises.  · Difficulty chewing.  · Tearing up of the affected eye.  · Dryness in the affected eye.  · Drooling.  · Pain behind one ear.  DIAGNOSIS   Diagnosis of Bell palsy may include:  · A medical history and physical exam.  · An MRI.  · A CT scan.  · Electromyography (EMG). This is a test that checks how your nerves are working.  TREATMENT   Treatment may include antiviral medicine to help shorten the length of the condition. Sometimes treatment is not needed and the symptoms go away on their own.  HOME CARE INSTRUCTIONS   · Take medicines only as directed by your health care provider.  · Do facial  massages and exercises as directed by your health care provider.  · If your eye is affected:  ¨ Use moisturizing eye drops to prevent drying of your eye as directed by your health care provider.  ¨ Protect your eye as directed by your health care provider.  SEEK MEDICAL CARE IF:  · Your symptoms do not get better or get worse.  · You are drooling.  · Your eye is red, irritated, or hurts.  SEEK IMMEDIATE MEDICAL CARE IF:   · Another part of your body feels weak or numb.  · You have difficulty swallowing.  · You have a fever along with symptoms of Bell palsy.  · You develop neck pain.  MAKE SURE YOU:   · Understand these instructions.  · Will watch your condition.  · Will get help right away if you are not doing well or get worse.     This information is not intended to replace advice given to you by your health care provider. Make sure you discuss any questions you have with your health care provider.     Document Released: 12/18/2006 Document Revised: 01/08/2016 Document Reviewed: 03/27/2015  [employer] Interactive Patient Education ©2016 Elsevier Inc.                Patient Information     Patient Information    Following emergency treatment: all patient requiring follow-up care must return either to a private physician or a clinic if your condition worsens before you are able to obtain further medical attention, please return to the emergency room.     Billing Information    At [facility], we work to make the billing process streamlined for our patients.  Our Representatives are here to answer any questions you may have regarding your hospital bill.  If you have insurance coverage and have supplied your insurance information to us, we will submit a claim to your insurer on your behalf.  Should you have any questions regarding your bill, we can be reached online or by phone as follows:  Online: You are able pay your bills online or live chat with our representatives about any billing questions you may have. We are  here to help Monday - Friday from 8:00am to 7:30pm and 9:00am - 12:00pm on Saturdays.  Please visit [email or web address]  for more information.   Phone:  [phone] or [phone]    Please note that your emergency physician, surgeon, pathologist, radiologist, anesthesiologist, and other specialists are not employed by [facility] and will therefore bill separately for their services.  Please contact them directly for any questions concerning their bills at the numbers below:     Emergency Physician Services:  [phone]  [facility]:  [phone]  [facility]:  [phone]  [facility]:  [phone]    1. Your final bill may vary from the amount quoted upon discharge if all procedures are not complete at that time, or if your doctor has additional procedures of which we are not aware. You will receive an additional bill if you return to the Emergency Department at [facility] for suture removal regardless of the facility of which the sutures were placed.     2. Please arrange for settlement of this account at the emergency registration.    3. All self-pay accounts are due in full at the time of treatment.  If you are unable to meet this obligation then payment is expected within 4-5 days.     4. If you have had radiology studies (CT, X-ray, Ultrasound, MRI), you have received a preliminary result during your emergency department visit. Please contact the radiology department [phone] to receive a copy of your final result. Please discuss the Final result with your primary physician or with the follow up physician provided.     Crisis Hotline:  [place] Crisis Hotline:  1-747-FICBFHH or 1-412.231.5820  [place] Crisis Hotline:    1-891.541.6111 or 918-317-1839         ED Discharge Follow Up Questions    1. In order to provide you with very good care, we would like to follow up with a phone call in the next few days.  May we  have your permission to contact you?     YES /  NO    2. What is the best phone number to call you? (       )_____-__________    3. What is the best time to call you?      Morning  /  Afternoon  /  Evening                   Patient Signature:  ____________________________________________________________    Date:  ____________________________________________________________

## 2017-06-04 NOTE — ED AVS SNAPSHOT
Sandboxx Access Code: [email or web address]  Expires: 7/2/2017  7:59 AM    Your email address is not on file at [facility].  Email Addresses are required for you to sign up for Sandboxx, please contact [phone] to verify your personal information and to provide your email address prior to attempting to register for Sandboxx.    Chino Diaz  [address] [place] [address].   [place] [address]    Sandboxx  A secure, online tool to manage your health information     [facility]’s Sandboxx® is a secure, online tool that connects you to your personalized health information from the privacy of your home -- day or night - making it very easy for you to manage your healthcare. Once the activation process is completed, you can even access your medical information using the Sandboxx jillian, which is available for free in the Apple Jillian store or Google Play store.     To learn more about Sandboxx, visit [email or web address]    There are two levels of access available (as shown below):   My Chart Features  [facility] Primary Care Doctor [facility]  Specialists [facility]  Urgent  Care Non-[facility] Primary Care Doctor   Email your healthcare team securely and privately 24/7 X X X    Manage appointments: schedule your next appointment; view details of past/upcoming appointments X      Request prescription refills. X      View recent personal medical records, including lab and immunizations X X X X   View health record, including health history, allergies, medications X X X X   Read reports about your outpatient visits, procedures, consult and ER notes X X X X   See your discharge summary, which is a recap of your hospital and/or ER visit that includes your diagnosis, lab results, and care plan X X  X     How to register for Sandboxx:  Once your e-mail address has been verified, follow the following steps to sign up for Sandboxx.     1. Go to  [email or web address]  2. Click on the Sign Up Now box, which takes you to the New Member Sign Up page. You  will need to provide the following information:  a. Enter your [facility] Access Code exactly as it appears at the top of this page. (You will not need to use this code after you’ve completed the sign-up process. If you do not sign up before the expiration date, you must request a new code.)   b. Enter your date of birth.   c. Enter your home email address.   d. Click Submit, and follow the next screen’s instructions.  3. Create a EvalYout ID. This will be your [facility] login ID and cannot be changed, so think of one that is secure and easy to remember.  4. Create a [facility] password. You can change your password at any time.  5. Enter your Password Reset Question and Answer. This can be used at a later time if you forget your password.   6. Enter your e-mail address. This allows you to receive e-mail notifications when new information is available in [facility].  7. Click Sign Up. You can now view your health information.    For assistance activating your [facility] account, call [phone]

## 2017-06-04 NOTE — ED NOTES
Chief Complaint   Patient presents with   • Sent from Urgent Care     off diabetes medication,fsbs >300   • Headache     since wednesday   • Nausea     Pt ambulated to triage, he originally went to urgent care for headache and nausea , sent here because patient's fsbs >300. He is diabetic type 2 and been off his medications for 2months due to insurance.   fsbs 339  Pt's -120, denies sob or chest pain. No distress noted.

## 2017-06-04 NOTE — MR AVS SNAPSHOT
"        Chino ARANGO Alton   2017 10:00 AM   Office Visit   MRN: [id number]    Department:  [facility]   Dept Phone:  [phone]    Description:  Male : 1975   Provider:  RUPALI Schroeder           Reason for Visit     Emesis X 1 day, headache X 6 days      Allergies as of 2017     No Known Allergies      You were diagnosed with     Non-intractable vomiting with nausea, unspecified vomiting type   [8605299]       Sinus pressure   [530853]       Non-seasonal allergic rhinitis, unspecified allergic rhinitis trigger   [7457792]       Acute non intractable tension-type headache   [2794327]         Vital Signs     Blood Pressure Pulse Temperature Respirations Height Weight    130/86 mmHg 88 35.7 °C (96.3 °F) 20 1.778 m (5' 10\") 80.74 kg (178 lb)    Body Mass Index Oxygen Saturation Smoking Status             25.54 kg/m2 96% Former Smoker         Basic Information     Date Of Birth Sex Race Ethnicity Preferred Language    1975 Male White Unknown English      Health Maintenance        Date Due Completion Dates    IMM DTaP/Tdap/Td Vaccine (1 - Tdap) 1992 ---            Current Immunizations     No immunizations on file.      Below and/or attached are the medications your provider expects you to take. Review all of your home medications and newly ordered medications with your provider and/or pharmacist. Follow medication instructions as directed by your provider and/or pharmacist. Please keep your medication list with you and share with your provider. Update the information when medications are discontinued, doses are changed, or new medications (including over-the-counter products) are added; and carry medication information at all times in the event of emergency situations     Allergies:  No Known Allergies          Medications  Valid as of: 2017 - 10:45 AM    Generic Name Brand Name Tablet Size Instructions for use    Amoxicillin-Pot Clavulanate (Tab) AUGMENTIN 875-125 MG " Take 1 Tab by mouth 2 times a day for 7 days.        Gemfibrozil (Tab) LOPID 600 MG Take 600 mg by mouth 2 times a day.        Lisinopril (Tab) PRINIVIL 10 MG Take 5 mg by mouth every day.        MetFORMIN HCl (Tab) GLUCOPHAGE 500 MG Take 1,000 mg by mouth 2 times a day, with meals.        Phenyleph-Promethazine-Cod (Syrup) PHENERGAN VC CODEINE 5-6.25-10 MG/5ML Take 5 mL by mouth every 6 hours as needed.        Polymyxin B-Trimethoprim (Solution) POLYTRIM 87989-0.1 UNIT/ML-% Place 1 Drop in both eyes every 4 hours for 10 days.        Sertraline HCl (Tab) ZOLOFT 50 MG Take 50 mg by mouth every day.        Simvastatin (Tab) ZOCOR 20 MG Take 20 mg by mouth every evening.        .                 Medicines prescribed today were sent to:     [facility]/PHARMACY #[id number] - [place], [place] - [address] [place] [address] AT [place] [address] [address]    Phone: [phone] Fax: [phone]    Open 24 Hours?: No      Medication refill instructions:       If your prescription bottle indicates you have medication refills left, it is not necessary to call your provider’s office. Please contact your pharmacy and they will refill your medication.    If your prescription bottle indicates you do not have any refills left, you may request refills at any time through one of the following ways: The online Lionexpo system (except Urgent Care), by calling your provider’s office, or by asking your pharmacy to contact your provider’s office with a refill request. Medication refills are processed only during regular business hours and may not be available until the next business day. Your provider may request additional information or to have a follow-up visit with you prior to refilling your medication.   *Please Note: Medication refills are assigned a new Rx number when refilled electronically. Your pharmacy may indicate that no refills were authorized even though a new prescription for the same medication is available at the  pharmacy. Please request the medicine by name with the pharmacy before contacting your provider for a refill.           Sarnova Access Code: [email or web address]  Expires: 7/2/2017  7:59 AM    Sarnova  A secure, online tool to manage your health information     [facility]’s Sarnova® is a secure, online tool that connects you to your personalized health information from the privacy of your home -- day or night - making it very easy for you to manage your healthcare. Once the activation process is completed, you can even access your medical information using the Sarnova jillian, which is available for free in the Apple Jillian store or Google Play store.     Sarnova provides the following levels of access (as shown below):   My Chart Features   [facility] Primary Care Doctor [facility]  Specialists [facility]  Urgent  Care Non-[facility]  Primary Care  Doctor   Email your healthcare team securely and privately 24/7 X X X    Manage appointments: schedule your next appointment; view details of past/upcoming appointments X      Request prescription refills. X      View recent personal medical records, including lab and immunizations X X X X   View health record, including health history, allergies, medications X X X X   Read reports about your outpatient visits, procedures, consult and ER notes X X X X   See your discharge summary, which is a recap of your hospital and/or ER visit that includes your diagnosis, lab results, and care plan. X X       How to register for Sarnova:  1. Go to  [email or web address].  2. Click on the Sign Up Now box, which takes you to the New Member Sign Up page. You will need to provide the following information:  a. Enter your Sarnova Access Code exactly as it appears at the top of this page. (You will not need to use this code after you’ve completed the sign-up process. If you do not sign up before the expiration date, you must request a new code.)   b. Enter your date of birth.   c. Enter your home email  address.   d. Click Submit, and follow the next screen’s instructions.  3. Create a Arbor Photonicst ID. This will be your TalkTo login ID and cannot be changed, so think of one that is secure and easy to remember.  4. Create a Arbor Photonicst password. You can change your password at any time.  5. Enter your Password Reset Question and Answer. This can be used at a later time if you forget your password.   6. Enter your e-mail address. This allows you to receive e-mail notifications when new information is available in TalkTo.  7. Click Sign Up. You can now view your health information.    For assistance activating your TalkTo account, call [phone]

## 2017-06-04 NOTE — DISCHARGE INSTRUCTIONS
Please follow up with a primary physician for blood pressure management, diabetic screening, and all other preventive health concerns.      Bell Palsy  Bell palsy is a condition in which the muscles on one side of the face become paralyzed. This often causes one side of the face to droop. It is a common condition and most people recover completely.  RISK FACTORS  Risk factors for Bell palsy include:  · Pregnancy.  · Diabetes.  · An infection by a virus, such as infections that cause cold sores.  CAUSES   Bell palsy is caused by damage to or inflammation of a nerve in your face. It is unclear why this happens, but an infection by a virus may lead to it. Most of the time the reason it happens is unknown.  SIGNS AND SYMPTOMS   Symptoms can range from mild to severe and can take place over a number of hours. Symptoms may include:  · Being unable to:  ¨ Raise one or both eyebrows.  ¨ Close one or both eyes.  ¨ Feel parts of your face (facial numbness).  · Drooping of the eyelid and corner of the mouth.  · Weakness in the face.  · Paralysis of half your face.  · Loss of taste.  · Sensitivity to loud noises.  · Difficulty chewing.  · Tearing up of the affected eye.  · Dryness in the affected eye.  · Drooling.  · Pain behind one ear.  DIAGNOSIS   Diagnosis of Bell palsy may include:  · A medical history and physical exam.  · An MRI.  · A CT scan.  · Electromyography (EMG). This is a test that checks how your nerves are working.  TREATMENT   Treatment may include antiviral medicine to help shorten the length of the condition. Sometimes treatment is not needed and the symptoms go away on their own.  HOME CARE INSTRUCTIONS   · Take medicines only as directed by your health care provider.  · Do facial massages and exercises as directed by your health care provider.  · If your eye is affected:  ¨ Use moisturizing eye drops to prevent drying of your eye as directed by your health care provider.  ¨ Protect your eye as directed by  your health care provider.  SEEK MEDICAL CARE IF:  · Your symptoms do not get better or get worse.  · You are drooling.  · Your eye is red, irritated, or hurts.  SEEK IMMEDIATE MEDICAL CARE IF:   · Another part of your body feels weak or numb.  · You have difficulty swallowing.  · You have a fever along with symptoms of Bell palsy.  · You develop neck pain.  MAKE SURE YOU:   · Understand these instructions.  · Will watch your condition.  · Will get help right away if you are not doing well or get worse.     This information is not intended to replace advice given to you by your health care provider. Make sure you discuss any questions you have with your health care provider.     Document Released: 12/18/2006 Document Revised: 01/08/2016 Document Reviewed: 03/27/2015  Elsevier Interactive Patient Education ©2016 [employer]

## 2017-06-04 NOTE — ED AVS SNAPSHOT
6/4/2017    Chino Diaz  [address]   [place] [address]    Dear Chino:    [facility] wants to ensure your discharge home is safe and you or your loved ones have had all of your questions answered regarding your care after you leave the hospital.    Below is a list of resources and contact information should you have any questions regarding your hospital stay, follow-up instructions, or active medical symptoms.    Questions or Concerns Regarding… Contact   Medical Questions Related to Your Discharge  (7 days a week, 8am-5pm) Contact a Nurse Care Coordinator   [phone]   Medical Questions Not Related to Your Discharge  (24 hours a day / 7 days a week)  Contact the Nurse Health Line   [phone]    Medications or Discharge Instructions Refer to your discharge packet   or contact your [facility] Primary Care Provider   [phone]   Follow-up Appointment(s) Schedule your appointment via [employer]   or contact Scheduling [phone]   Billing Review your statement via [employer]  or contact Billing [phone]   Medical Records Review your records via Br[employer]   or contact Medical Records [phone]     You may receive a telephone call within two days of discharge. This call is to make certain you understand your discharge instructions and have the opportunity to have any questions answered. You can also easily access your medical information, test results and upcoming appointments via the [employer] free online health management tool. You can learn more and sign up at [email or web address]. For assistance setting up your [employer] account, please call [phone].    Once again, we want to ensure your discharge home is safe and that you have a clear understanding of any next steps in your care. If you have any questions or concerns, please do not hesitate to contact us, we are here for you. Thank you for choosing [facility] for your healthcare needs.    Sincerely,    Your [facility] Healthcare Team

## 2017-06-04 NOTE — PROGRESS NOTES
Subjective:      Chino Diaz is a 41 y.o. male who presents with Emesis            Emesis  Associated symptoms include congestion, headaches, nausea and vomiting. Pertinent negatives include no abdominal pain, chest pain, chills, coughing, fever, myalgias, sore throat or weakness.   Chino is a 41 year old male who is her for n/v and headache x 2 days. Was here 2 days ago and dx with sinus problems. Taking Augmentin. States unable to keep fluids down , no food intake x 2 days. Water not staying down. H/o DM.     PMH:  has a past medical history of Type II or unspecified type diabetes mellitus without mention of complication, not stated as uncontrolled; Depression; Hypertension; and Hyperlipidemia.  MEDS:   Current outpatient prescriptions:   •  polymixin-trimethoprim (POLYTRIM) 87804-2.1 UNIT/ML-% Solution, Place 1 Drop in both eyes every 4 hours for 10 days., Disp: 1 Bottle, Rfl: 0  •  amoxicillin-clavulanate (AUGMENTIN) 875-125 MG Tab, Take 1 Tab by mouth 2 times a day for 7 days., Disp: 14 Tab, Rfl: 0  •  sertraline (ZOLOFT) 50 MG TABS, Take 50 mg by mouth every day., Disp: , Rfl:   •  simvastatin (ZOCOR) 20 MG TABS, Take 20 mg by mouth every evening., Disp: , Rfl:   •  gemfibrozil (LOPID) 600 MG TABS, Take 600 mg by mouth 2 times a day., Disp: , Rfl:   •  prometh-phenylephrine-codeine (PHENERGAN VC CODEINE) 5-6.25-10 MG/5ML SYRP, Take 5 mL by mouth every 6 hours as needed., Disp: 150 mL, Rfl: 0  •  metformin (GLUCOPHAGE) 500 MG TABS, Take 1,000 mg by mouth 2 times a day, with meals., Disp: , Rfl:   •  lisinopril (PRINIVIL) 10 MG TABS, Take 5 mg by mouth every day., Disp: , Rfl:   ALLERGIES: No Known Allergies  SURGHX:   Past Surgical History   Procedure Laterality Date   • Tonsillectomy       SOCHX:  reports that he quit smoking about 11 years ago. His smoking use included Cigarettes. He does not have any smokeless tobacco history on file. He reports that he drinks alcohol. He reports that he does not use  "illicit drugs.  FH: Family history was reviewed, no pertinent findings to report      Review of Systems   Constitutional: Positive for malaise/fatigue. Negative for fever and chills.   HENT: Positive for congestion. Negative for ear pain and sore throat.    Eyes: Negative for blurred vision, double vision, photophobia, discharge and redness.   Respiratory: Negative for cough, shortness of breath and wheezing.    Cardiovascular: Negative for chest pain, palpitations and orthopnea.   Gastrointestinal: Positive for nausea and vomiting. Negative for abdominal pain, diarrhea and constipation.   Genitourinary: Negative for dysuria, urgency and frequency.   Musculoskeletal: Negative for myalgias.   Neurological: Positive for headaches. Negative for dizziness, tingling, sensory change, speech change, focal weakness and weakness.   Endo/Heme/Allergies: Negative for environmental allergies.   All other systems reviewed and are negative.         Objective:     /86 mmHg  Pulse 88  Temp(Src) 35.7 °C (96.3 °F)  Resp 20  Ht 1.778 m (5' 10\")  Wt 80.74 kg (178 lb)  BMI 25.54 kg/m2  SpO2 96%     Physical Exam   Constitutional: He is oriented to person, place, and time. Vital signs are normal. He appears well-developed and well-nourished. He is active.  Non-toxic appearance. He does not have a sickly appearance. He appears ill. No distress.   HENT:   Head: Normocephalic.   Nose: Mucosal edema and sinus tenderness present. No rhinorrhea. Right sinus exhibits maxillary sinus tenderness. Left sinus exhibits maxillary sinus tenderness.   Mouth/Throat: Uvula is midline and oropharynx is clear and moist. Mucous membranes are dry. No uvula swelling.   Eyes: Conjunctivae and EOM are normal. Pupils are equal, round, and reactive to light.   Neck: Normal range of motion. Neck supple.   Cardiovascular: Normal rate and regular rhythm.    Pulmonary/Chest: Effort normal and breath sounds normal. No accessory muscle usage. No " "respiratory distress. He has no decreased breath sounds. He has no wheezes. He has no rhonchi. He has no rales.   Musculoskeletal: Normal range of motion.   Neurological: He is alert and oriented to person, place, and time.   Skin: Skin is warm. He is not diaphoretic.   Vitals reviewed.              Assessment/Plan:     1. Non-intractable vomiting with nausea, unspecified vomiting type    - POC GLUCOSE: 311    2. Sinus pressure    3.Non-seasonal allergic rhinitis, unspecified allergic rhinitis trigger    4. Acute non intractable tension-type headache    - POC GLUCOSE: 311    Speaking to patient, he is diabetic and has not taken any oral diabetic medications x 3 months due to financial/insurance problems. Was seen 2 days ago in  and was feeling sick with sinus problems but has been having n/v, headache, dehydration due to inability to keep fluids down x 2 days. Patient smells of ketones/\"fruity smell\", fatigue and overall \"feeling ill\". Recommend ER for IVF, blood work and blood sugar monitoring for r/o DKA. Patient agrees to this plan of care.   "

## 2017-12-26 ENCOUNTER — OFFICE VISIT (OUTPATIENT)
Dept: MEDICAL GROUP | Facility: MEDICAL CENTER | Age: 42
End: 2017-12-26
Payer: COMMERCIAL

## 2017-12-26 ENCOUNTER — NON-PROVIDER VISIT (OUTPATIENT)
Dept: MEDICAL GROUP | Facility: MEDICAL CENTER | Age: 42
End: 2017-12-26
Payer: COMMERCIAL

## 2017-12-26 VITALS
RESPIRATION RATE: 16 BRPM | DIASTOLIC BLOOD PRESSURE: 74 MMHG | SYSTOLIC BLOOD PRESSURE: 112 MMHG | BODY MASS INDEX: 27.77 KG/M2 | HEIGHT: 70 IN | OXYGEN SATURATION: 98 % | TEMPERATURE: 97.8 F | WEIGHT: 194 LBS | HEART RATE: 90 BPM

## 2017-12-26 DIAGNOSIS — Z76.89 ENCOUNTER TO ESTABLISH CARE: ICD-10-CM

## 2017-12-26 DIAGNOSIS — I10 ESSENTIAL HYPERTENSION: ICD-10-CM

## 2017-12-26 DIAGNOSIS — E78.5 DYSLIPIDEMIA: ICD-10-CM

## 2017-12-26 DIAGNOSIS — Z00.00 HEALTH CARE MAINTENANCE: ICD-10-CM

## 2017-12-26 PROCEDURE — 99204 OFFICE O/P NEW MOD 45 MIN: CPT | Performed by: INTERNAL MEDICINE

## 2017-12-26 PROCEDURE — 92250 FUNDUS PHOTOGRAPHY W/I&R: CPT | Mod: TC | Performed by: INTERNAL MEDICINE

## 2017-12-26 ASSESSMENT — PATIENT HEALTH QUESTIONNAIRE - PHQ9: CLINICAL INTERPRETATION OF PHQ2 SCORE: 0

## 2017-12-26 NOTE — PROGRESS NOTES
CHIEF COMPLIANT:   Chief Complaint   Patient presents with   • Establish Care     Type 2 diabetes     Chino Diaz is a 42 y.o. male here for DM follow up    DIABETES MELLITUS TYPE 2  Onset/D  Diabetes education:  Y    Medications:   · Metformin:  1000 mg BID   · Empagliflozin-Linagliptin (GLYXAMBI) 10-5 MG Tab QD  · ACE/ARB: lisinopril  · Statin: simvastatin 20 mg QD  · ASA: Y  Compliant with medications: yes  Checking feet daily/wear soft socks/shoes: advised    Diabetes ABCDE TARGETS  · A1c, last:  pending  · Fingersticks:  N  · Mean BS:  NA  · Hypoglycemia:  No  · No symptoms of hypoglycemia: tremors, hunger, sometimes dizzy  · Blood Pressure, goal < 140/90: yes  · Cholesterol-Lipid Panel: pending  · Dysalbuminuria:  pending    Diet: decreased carbs  Exercise:  N  Body mass index is 27.84 kg/m².    DM complications:  · Peripheral neuropathy: No numbness or tingling sensation in the feet.  · Retinopathy:      Last eye exam: . No evidence of retinopathy.    · Nephropathy:     Neg  · CVS:     No CAD symptoms (CP/pressure, exertional dyspnea, edema).    · GI:     No gastropathy sx (nausea/vomiting).    FH of DM: sister    HYPERTENSION  Meds: lisinopril, 10 mg daily; taking as prescribed.   He is not measuring BP at home.  Denies:  -  headaches, vision problems, tinnitus.                 -  chest pain/pressure, palpitations, irregular heart beats, exertional, dyspnea, peripheral edema.  Low salt diet: N  Diet / exercise / BMI: as above.   FH of HTN: father    LIPID PROFILE / HYPERLIPIDEMIA  Statin:  Simvastatin, 20 mg daily, gemfibrozil 600 mg daily, taking as prescribedNo muscle weakness, cramps, nausea,abdominal discomfort.   Diet / exercise / BMI: as above.   FH: mother    Reviewed PMH, PSH, FH, SH, ALL, IMM, MEDS.     Current medicines (including changes today)  Current Outpatient Prescriptions   Medication Sig Dispense Refill   • Empagliflozin-Linagliptin (GLYXAMBI) 10-5 MG Tab Take 1 Tab by  mouth every day.     • metformin (GLUCOPHAGE) 1000 MG tablet Take 1 Tab by mouth 2 times a day, with meals. 60 Tab 1   • lisinopril (PRINIVIL) 10 MG Tab Take 1 Tab by mouth every day. 30 Tab 0   • Artificial Tear Solution (TEARS NATURALE FREE) 0.1-0.3 % Solution Apply in the left eye every 1-2 drops while awake 1 Each 0   • artificial tears (LACRI-LUBE) Ointment ophthalmic ointment Place 1 Inch in left eye every bedtime. For 3 weeks 1 Tube 1   • simvastatin (ZOCOR) 20 MG TABS Take 20 mg by mouth every evening.     • gemfibrozil (LOPID) 600 MG TABS Take 600 mg by mouth 2 times a day.       No current facility-administered medications for this visit.      Allergies: Patient has no known allergies.  He  has a past medical history of Depression; Hyperlipidemia; Hypertension; and Type II or unspecified type diabetes mellitus without mention of complication, not stated as uncontrolled.  He  has a past surgical history that includes tonsillectomy.  Social History   Substance Use Topics   • Smoking status: Former Smoker     Types: Cigarettes     Quit date: 1/1/2006   • Smokeless tobacco: Never Used   • Alcohol use No     Social History     Social History Narrative   • No narrative on file     Family History   Problem Relation Age of Onset   • No Known Problems Mother    • Cancer Father      lymphoma   • Diabetes Sister    • No Known Problems Brother      Family Status   Relation Status   • Mother Alive   • Father Alive   • Sister    • Brother      ROS   Constitutional: Negative for fever, chills and weight loss.   HEENT: Negative for blurred vision, sore throat, swollen glands. No hearing loss or vertigo.  Respiratory: Negative for cough, wheezing, shortness of breath.   CVS: Negative for chest pain, palpitations, irregular heart beats, exertional dyspnea.   GI: Negative for heartburn, abdominal pain, nausea, vomiting, change in BMs.   : Negative for dysuria, polyuria.   MS: Negative for myalgias, joint pain.   Neuro: no  "headaches, numbness, weakness, seizures.   Skin: no skin lesions, rash.  Endocrine: per HPI.     PHYSICAL EXAM   Blood pressure 112/74, pulse 90, temperature 36.6 °C (97.8 °F), resp. rate 16, height 1.778 m (5' 10\"), weight 88 kg (194 lb), SpO2 98 %. Body mass index is 27.84 kg/m².  Alert, oriented in no acute distress.  Eye contact is good, speech goal directed, affect bright.  HEENT: EOMI, PERRL, conjunctiva non-injected, sclera non-icteric.  Nares patent with no significant congestion or drainage.  Normal pinnae, external auditory canals, TM pearly gray with normal light reflex bilaterally.  Oral mucous membranes pink and moist with no lesions.  Neck supple with no cervical lymphadenopathy, JVD, palpable thyroid nodules or carotid bruits.  Lungs: clear to auscultation bilaterally with good excursion.  CV: regular rate and rhythm, without murmur, rubs, thrills, or gallops.  Abdomen: soft, non-distended, non-tender with normal bowel sounds. No CVAT, No masses, or organomegaly.  Lower extremities: color normal, vascularity normal, no edema, temperature normal  Musculoskeletal: Normal gait. No obvious muscle weakness or wasting.  Psych: Normal mood and affect. Alert and oriented x3. Judgment and insight is normal.  Neuro:speech normal, mental status intact, cranial nerves 2-12 intact, gait, including heel, toe, and tandem walking normal, muscle tone normal, muscle strength normal, sensation to light touch and pinprick normal, reflexes normal and symmetric    LABS     Pending    ASSESMENT AND PLAN     1. Uncontrolled type 2 diabetes mellitus without complication, without long-term current use of insulin (CMS-[facility])  Discussed about:    - importance of appropriate diet and exercise.   - hypoglycemic symptoms and precautions.    - long-term complications of uncontrolled DM, (increased risk of CAD, strokes, retinopathy, nephropathy, /can lead to kidney failure, dialysis/, peripheral neuropathy, (can lead to amputation)   - " Good DM control can prevent / delay many of these complications.    - Recommended appropriate foot care     - COMP METABOLIC PANEL; Future  - HEMOGLOBIN A1C; Future  - MICROALBUMIN CREAT RATIO URINE; Future  - POCT Retinal Eye Exam  - aspirin EC (ECOTRIN) 81 MG Tablet Delayed Response; Take 1 Tab by mouth every day.  Dispense: 100 Tab; Refill: 11    2. Essential hypertension  Controlled, continue current treatment  - COMP METABOLIC PANEL; Future    3. Dyslipidemia  Pending labs, continue current treatment  - COMP METABOLIC PANEL; Future  - LIPID PROFILE; Future    4. Health care maintenance  Pending records    5. Encounter to establish care  Reviewed PMH, PSH, FH, SH, ALL, MEDS, HCM/IMM.   Advised healthy habits, diet, exercise.  Release form for old records: signed    All questions are answered.    Health Maintenance: Completed    All questions are answered.    Smoking Counseling: Nonsmoker    Followup: Return in about 1 week (around 1/2/2018) for Short.      *A1c target should be based on age and diabetes complication. If the patient has any complications including retinopathy, nephropathy, or neuropathy the risk of hypoglycemia increases. Therefore the target A1c for these patients should be higher. Also higher A1c targets are appropriate and SAFER for patients who have poor medical prognoses, compliance problems, hypoglycemic unawareness, and intellectual deficits.  _____________________________________________________________________  Age <75 with no complications 7%/with complications 8%  Age >75 with no complications 8%/with complications 9%   Please note that this dictation was created using voice recognition software. Despite all efforts, some minor grammar mistakes are possible.    · (Target Urine Albumin Creatinine Ratio [UACR] < 7.5 In women and < 4.0 in men)   · Vitamin D: Target: 38-40. Levels <20 or >60 asociated with increased ALL cause mortality

## 2017-12-26 NOTE — PROGRESS NOTES
Chino Diaz is a 42 y.o. male here for a non-provider visit for retinal eye exam-photos obtained and sent to ophthalmologist.    Routed to PCP? Yes

## 2017-12-27 ENCOUNTER — TELEPHONE (OUTPATIENT)
Dept: MEDICAL GROUP | Facility: MEDICAL CENTER | Age: 42
End: 2017-12-27

## 2017-12-27 NOTE — TELEPHONE ENCOUNTER
VOICEMAIL  1. Caller Name: Chino Diaz                       Call Back Number: [phone] (home)      2. Message: Patient's voicemail stated PCP asked he report when he got his labs drawn. They were drawn this morning per patient.    3. Patient approves office to leave a detailed voicemail/MyChart message: N\A

## 2018-01-02 ENCOUNTER — OFFICE VISIT (OUTPATIENT)
Dept: MEDICAL GROUP | Facility: MEDICAL CENTER | Age: 43
End: 2018-01-02
Payer: COMMERCIAL

## 2018-01-02 VITALS
HEIGHT: 70 IN | SYSTOLIC BLOOD PRESSURE: 120 MMHG | TEMPERATURE: 98.2 F | WEIGHT: 194 LBS | BODY MASS INDEX: 27.77 KG/M2 | OXYGEN SATURATION: 96 % | HEART RATE: 94 BPM | RESPIRATION RATE: 16 BRPM | DIASTOLIC BLOOD PRESSURE: 74 MMHG

## 2018-01-02 DIAGNOSIS — Z00.00 HEALTH CARE MAINTENANCE: ICD-10-CM

## 2018-01-02 DIAGNOSIS — L60.0 IGTN (INGROWING TOE NAIL): ICD-10-CM

## 2018-01-02 DIAGNOSIS — I10 ESSENTIAL HYPERTENSION: ICD-10-CM

## 2018-01-02 DIAGNOSIS — E78.5 DYSLIPIDEMIA: ICD-10-CM

## 2018-01-02 DIAGNOSIS — E11.9 CONTROLLED TYPE 2 DIABETES MELLITUS WITHOUT COMPLICATION, WITHOUT LONG-TERM CURRENT USE OF INSULIN (HCC): ICD-10-CM

## 2018-01-02 PROCEDURE — 99214 OFFICE O/P EST MOD 30 MIN: CPT | Performed by: INTERNAL MEDICINE

## 2018-01-02 RX ORDER — FENOFIBRATE 160 MG/1
160 TABLET ORAL DAILY
Qty: 90 TAB | Refills: 1 | Status: SHIPPED | OUTPATIENT
Start: 2018-01-02 | End: 2018-11-01 | Stop reason: SDUPTHER

## 2018-01-02 RX ORDER — SIMVASTATIN 40 MG
40 TABLET ORAL EVERY EVENING
Qty: 90 TAB | Refills: 1 | Status: SHIPPED | OUTPATIENT
Start: 2018-01-02 | End: 2018-11-01 | Stop reason: SDUPTHER

## 2018-01-02 RX ORDER — LISINOPRIL 10 MG/1
10 TABLET ORAL DAILY
Qty: 90 TAB | Refills: 1 | Status: SHIPPED | OUTPATIENT
Start: 2018-01-02 | End: 2018-11-01 | Stop reason: SDUPTHER

## 2018-01-02 NOTE — PROGRESS NOTES
CHIEF COMPLIANT:   Chief Complaint   Patient presents with   • Diabetes     Chino Diaz is a 42 y.o. male here for DM follow up    DIABETES MELLITUS TYPE 2  Onset/D  Diabetes education:  Y     Medications:   · Metformin:  1000 mg BID   · Empagliflozin-Linagliptin (GLYXAMBI) 10-5 MG Tab QD  · ACE/ARB: lisinopril  · Statin: simvastatin 20 mg QD  · ASA: Y  Compliant with medications: yes  Checking feet daily/wear soft socks/shoes: advised     Diabetes ABCDE TARGETS  · A1c, last:  pending  · Fingersticks:  N  · Mean BS:  NA  · Hypoglycemia:  No  § No symptoms of hypoglycemia: tremors, hunger, sometimes dizzy  · Blood Pressure, goal < 140/90: yes  · Cholesterol-Lipid Panel: very high  · Dysalbuminuria:  neg     Diet: decreased carbs  Exercise:  N  Body mass index is 27.84 kg/m².     DM complications:  · Peripheral neuropathy:                     No numbness or tingling sensation in the feet.  · Retinopathy:                                                Last eye exam: . No evidence of retinopathy.    · Nephropathy:                                              Neg  · CVS:                                                                                No CAD symptoms (CP/pressure, exertional dyspnea, edema).    · GI:                                                                                   No gastropathy sx (nausea/vomiting).     FH of DM: sister     HYPERTENSION  Meds: lisinopril, 10 mg daily; taking as prescribed.   He is not measuring BP at home.  Denies:  -  headaches, vision problems, tinnitus.                 -  chest pain/pressure, palpitations, irregular heart beats, exertional, dyspnea, peripheral edema.  Low salt diet: N  Diet / exercise / BMI: as above.   FH of HTN: father     LIPID PROFILE / HYPERLIPIDEMIA  Statin:  Simvastatin, 20 mg daily, taking as prescribed. No muscle weakness, cramps, nausea,abdominal discomfort.   Diet / exercise / BMI: as above.   FH: mother    Reviewed PMH,  PSH, FH, SH, ALL, IMM, MEDS.     Current medicines (including changes today)  Current Outpatient Prescriptions   Medication Sig Dispense Refill   • Empagliflozin-Linagliptin (GLYXAMBI) 10-5 MG Tab Take 1 Tab by mouth every day.     • aspirin EC (ECOTRIN) 81 MG Tablet Delayed Response Take 1 Tab by mouth every day. 100 Tab 11   • Artificial Tear Solution (TEARS NATURALE FREE) 0.1-0.3 % Solution Apply in the left eye every 1-2 drops while awake 1 Each 0   • artificial tears (LACRI-LUBE) Ointment ophthalmic ointment Place 1 Inch in left eye every bedtime. For 3 weeks 1 Tube 1   • metformin (GLUCOPHAGE) 1000 MG tablet Take 1 Tab by mouth 2 times a day, with meals. 60 Tab 1   • lisinopril (PRINIVIL) 10 MG Tab Take 1 Tab by mouth every day. 30 Tab 0   • simvastatin (ZOCOR) 20 MG TABS Take 20 mg by mouth every evening.     • gemfibrozil (LOPID) 600 MG TABS Take 600 mg by mouth 2 times a day.       No current facility-administered medications for this visit.      Allergies: Patient has no known allergies.  He  has a past medical history of Depression; Hyperlipidemia; Hypertension; and Type II or unspecified type diabetes mellitus without mention of complication, not stated as uncontrolled.  He  has a past surgical history that includes tonsillectomy.  Social History   Substance Use Topics   • Smoking status: Former Smoker     Types: Cigarettes     Quit date: 1/1/2006   • Smokeless tobacco: Never Used   • Alcohol use No     Social History     Social History Narrative   • No narrative on file     Family History   Problem Relation Age of Onset   • No Known Problems Mother    • Cancer Father      lymphoma   • Diabetes Sister    • No Known Problems Brother      Family Status   Relation Status   • Mother Alive   • Father Alive   • Sister    • Brother      ROS   Constitutional: Negative for fever, chills and weight loss.   HEENT: Negative for blurred vision, sore throat, swollen glands. No hearing loss or vertigo.  Respiratory:  "Negative for cough, wheezing, shortness of breath.   CVS: Negative for chest pain, palpitations, irregular heart beats, exertional dyspnea.   GI: Negative for heartburn, abdominal pain, nausea, vomiting, change in BMs.   : Negative for dysuria, polyuria.   MS: Negative for myalgias, joint pain.   Neuro: no headaches, numbness, weakness, seizures.   Skin: no skin lesions, rash.  Endocrine: per HPI.     PHYSICAL EXAM   Blood pressure 120/74, pulse 94, temperature 36.8 °C (98.2 °F), resp. rate 16, height 1.778 m (5' 10\"), weight 88 kg (194 lb), SpO2 96 %. Body mass index is 27.84 kg/m².  Alert, oriented in no acute distress.  Eye contact is good, speech goal directed, affect bright.  HEENT: EOMI, PERRL, conjunctiva non-injected, sclera non-icteric.  Nares patent with no significant congestion or drainage.  Normal pinnae, external auditory canals, TM pearly gray with normal light reflex bilaterally.  Oral mucous membranes pink and moist with no lesions.  Neck supple with no cervical lymphadenopathy, JVD, palpable thyroid nodules or carotid bruits.  Lungs: clear to auscultation bilaterally with good excursion.  CV: regular rate and rhythm, without murmur, rubs, thrills, or gallops.  Abdomen: soft, non-distended, non-tender with normal bowel sounds. No CVAT, No masses, or organomegaly.  Lower extremities: color normal, vascularity normal, no edema, temperature normal  Musculoskeletal: Normal gait. No obvious muscle weakness or wasting.  Psych: Normal mood and affect. Alert and oriented x3. Judgment and insight is normal.  Neuro:speech normal, mental status intact, cranial nerves 2-12 intact, gait, including heel, toe, and tandem walking normal, muscle tone normal, muscle strength normal, sensation to light touch and pinprick normal, reflexes normal and symmetric  DM foot exam: intact to pin prick, bilaterally.  Dorsalis pedis pulse is +4/4 bilaterally. No redness, ulcers, calluses.   Ingrown great toenails " bilaterally.    LABS     Results reviewed and discussed with the patient, questions answered (12/27/17, scanned in media):  · CMP: Glycemia 176, otherwise within normal limits  · A1c: 7.0  · Lipid panel: Total cholesterol 435, triglycerides 992  · Microalbumin, urine: Negative    ASSESMENT AND PLAN     1. Controlled type 2 diabetes mellitus without complication, without long-term current use of insulin (CMS-HCC)  Controlled, continue current treatment  - COMP METABOLIC PANEL; Future  - HEMOGLOBIN A1C; Future    Discussed about:    - importance of appropriate diet and exercise.   - hypoglycemic symptoms and precautions.    - long-term complications of uncontrolled DM, (increased risk of CAD, strokes, retinopathy, nephropathy, /can lead to kidney failure, dialysis/, peripheral neuropathy, (can lead to amputation)   - Good DM control can prevent / delay many of these complications.    - Recommended appropriate foot care     2. Essential hypertension  Controlled, continue with current treatment, and monitoring blood pressure at home  - lisinopril (PRINIVIL) 10 MG Tab; Take 1 Tab by mouth every day.  Dispense: 90 Tab; Refill: 1    3. Dyslipidemia  Uncontrolled, increase simvastatin to 40 mg daily, start fenofibrate  - simvastatin (ZOCOR) 40 MG Tab; Take 1 Tab by mouth every evening.  Dispense: 90 Tab; Refill: 1  - fenofibrate (TRIGLIDE) 160 MG tablet; Take 1 Tab by mouth every day.  Dispense: 90 Tab; Refill: 1  - LIPID PROFILE; Future    4. Health care maintenance  Advised missing immunizations x 3.    5. IGTN (ingrowing toe nail)  Found on exam  - REFERRAL TO PODIATRY    All questions are answered.    Smoking Counseling: Nonsmoker    Followup: Return in about 4 months (around 5/2/2018) for Short.      *A1c target should be based on age and diabetes complication. If the patient has any complications including retinopathy, nephropathy, or neuropathy the risk of hypoglycemia increases. Therefore the target A1c for these  patients should be higher. Also higher A1c targets are appropriate and SAFER for patients who have poor medical prognoses, compliance problems, hypoglycemic unawareness, and intellectual deficits.  _____________________________________________________________________  Age <75 with no complications 7%/with complications 8%  Age >75 with no complications 8%/with complications 9%   Please note that this dictation was created using voice recognition software. Despite all efforts, some minor grammar mistakes are possible.    · (Target Urine Albumin Creatinine Ratio [UACR] < 7.5 In women and < 4.0 in men)   · Vitamin D: Target: 38-40. Levels <20 or >60 asociated with increased ALL cause mortality

## 2018-01-02 NOTE — TELEPHONE ENCOUNTER
Phone Number Called: [phone] ([facility])    Message: Labs are going to faxed over.     Left Message for patient to call back: N\A

## 2018-01-04 ENCOUNTER — TELEPHONE (OUTPATIENT)
Dept: MEDICAL GROUP | Facility: MEDICAL CENTER | Age: 43
End: 2018-01-04

## 2018-01-04 LAB
ALBUMIN SERPL-MCNC: 4.9 G/DL (ref 3.5–5.5)
ALBUMIN/CREAT UR: 22.5 MG/G CREAT (ref 0–30)
ALBUMIN/GLOB SERPL: 1.9 {RATIO} (ref 1.2–2.2)
ALP SERPL-CCNC: 67 IU/L (ref 39–117)
ALT SERPL-CCNC: 30 IU/L (ref 0–44)
AST SERPL-CCNC: 18 IU/L (ref 0–40)
BILIRUB SERPL-MCNC: 0.4 MG/DL (ref 0–1.2)
BUN SERPL-MCNC: 14 MG/DL (ref 6–24)
BUN/CREAT SERPL: 16 (ref 9–20)
CALCIUM SERPL-MCNC: 9.6 MG/DL (ref 8.7–10.2)
CHLORIDE SERPL-SCNC: 101 MMOL/L (ref 96–106)
CHOLEST SERPL-MCNC: 435 MG/DL (ref 100–199)
CO2 SERPL-SCNC: 19 MMOL/L (ref 18–29)
COMMENT 011824: ABNORMAL
CREAT SERPL-MCNC: 0.88 MG/DL (ref 0.76–1.27)
CREAT UR-MCNC: 79.9 MG/DL
GLOBULIN SER CALC-MCNC: 2.6 G/DL (ref 1.5–4.5)
GLUCOSE SERPL-MCNC: 176 MG/DL (ref 65–99)
HBA1C MFR BLD: 7 % (ref 4.8–5.6)
HDLC SERPL-MCNC: 8 MG/DL
IF AFRICAN AMERICAN  100797: 121 ML/MIN/1.73
IF NON AFRICAN AMER 100791: 104 ML/MIN/1.73
LDLC SERPL CALC-MCNC: ABNORMAL MG/DL (ref 0–99)
MICROALBUMIN UR-MCNC: 18 UG/ML
POTASSIUM SERPL-SCNC: 4.9 MMOL/L (ref 3.5–5.2)
PROT SERPL-MCNC: 7.5 G/DL (ref 6–8.5)
SODIUM SERPL-SCNC: 142 MMOL/L (ref 134–144)
TRIGL SERPL-MCNC: 992 MG/DL (ref 0–149)
VLDLC SERPL CALC-MCNC: ABNORMAL MG/DL (ref 5–40)

## 2018-01-05 NOTE — TELEPHONE ENCOUNTER
Phone Number Called: [phone] (home)      Message: Left message for patient to call us back to make an appointment.     Left Message for patient to call back: yes

## 2018-01-05 NOTE — TELEPHONE ENCOUNTER
----- Message from Susanna Howard M.D. sent at 1/4/2018  3:27 PM PST -----  Please notify patient that I reviewed labs - to schedule office visit to discuss, thank you

## 2018-01-08 NOTE — TELEPHONE ENCOUNTER
Phone Number Called: [phone] (home)  3    Message: Left message for patient to make an appointment.     Left Message for patient to call back: yes

## 2018-01-09 NOTE — TELEPHONE ENCOUNTER
Phone Number Called: [phone] (home)      Message: Left message for patient to make an appointment.     Left Message for patient to call back: yes

## 2018-06-01 DIAGNOSIS — E11.9 CONTROLLED TYPE 2 DIABETES MELLITUS WITHOUT COMPLICATION, WITHOUT LONG-TERM CURRENT USE OF INSULIN (HCC): ICD-10-CM

## 2018-06-01 RX ORDER — EMPAGLIFLOZIN AND LINAGLIPTIN 10; 5 MG/1; MG/1
TABLET, FILM COATED ORAL
Qty: 30 TAB | Refills: 0 | Status: SHIPPED | OUTPATIENT
Start: 2018-06-01 | End: 2018-07-16 | Stop reason: SDUPTHER

## 2018-06-18 DIAGNOSIS — E11.9 CONTROLLED TYPE 2 DIABETES MELLITUS WITHOUT COMPLICATION, WITHOUT LONG-TERM CURRENT USE OF INSULIN (HCC): ICD-10-CM

## 2018-07-16 DIAGNOSIS — E11.9 CONTROLLED TYPE 2 DIABETES MELLITUS WITHOUT COMPLICATION, WITHOUT LONG-TERM CURRENT USE OF INSULIN (HCC): ICD-10-CM

## 2018-07-16 RX ORDER — EMPAGLIFLOZIN AND LINAGLIPTIN 10; 5 MG/1; MG/1
TABLET, FILM COATED ORAL
Qty: 30 TAB | Refills: 0 | Status: SHIPPED | OUTPATIENT
Start: 2018-07-16 | End: 2018-08-16 | Stop reason: SDUPTHER

## 2018-08-16 DIAGNOSIS — E11.9 CONTROLLED TYPE 2 DIABETES MELLITUS WITHOUT COMPLICATION, WITHOUT LONG-TERM CURRENT USE OF INSULIN (HCC): ICD-10-CM

## 2018-08-16 RX ORDER — EMPAGLIFLOZIN AND LINAGLIPTIN 10; 5 MG/1; MG/1
TABLET, FILM COATED ORAL
Qty: 30 TAB | Refills: 0 | Status: SHIPPED | OUTPATIENT
Start: 2018-08-16 | End: 2018-10-05 | Stop reason: SDUPTHER

## 2018-09-07 ENCOUNTER — OFFICE VISIT (OUTPATIENT)
Dept: URGENT CARE | Facility: CLINIC | Age: 43
End: 2018-09-07
Payer: COMMERCIAL

## 2018-09-07 ENCOUNTER — APPOINTMENT (OUTPATIENT)
Dept: RADIOLOGY | Facility: IMAGING CENTER | Age: 43
End: 2018-09-07
Attending: PHYSICIAN ASSISTANT
Payer: COMMERCIAL

## 2018-09-07 VITALS
DIASTOLIC BLOOD PRESSURE: 90 MMHG | OXYGEN SATURATION: 98 % | TEMPERATURE: 97.6 F | WEIGHT: 194 LBS | SYSTOLIC BLOOD PRESSURE: 142 MMHG | BODY MASS INDEX: 27.77 KG/M2 | HEIGHT: 70 IN | RESPIRATION RATE: 18 BRPM | HEART RATE: 101 BPM

## 2018-09-07 DIAGNOSIS — R11.14 BILIOUS VOMITING WITH NAUSEA: ICD-10-CM

## 2018-09-07 DIAGNOSIS — R10.9 ABDOMINAL CRAMPING: ICD-10-CM

## 2018-09-07 DIAGNOSIS — R73.9 HYPERGLYCEMIA: Primary | ICD-10-CM

## 2018-09-07 LAB — GLUCOSE BLD-MCNC: 305 MG/DL (ref 70–100)

## 2018-09-07 PROCEDURE — 74018 RADEX ABDOMEN 1 VIEW: CPT | Mod: TC | Performed by: PHYSICIAN ASSISTANT

## 2018-09-07 PROCEDURE — 99214 OFFICE O/P EST MOD 30 MIN: CPT | Performed by: PHYSICIAN ASSISTANT

## 2018-09-07 PROCEDURE — 82962 GLUCOSE BLOOD TEST: CPT | Performed by: PHYSICIAN ASSISTANT

## 2018-09-07 RX ORDER — ONDANSETRON 4 MG/1
4 TABLET, ORALLY DISINTEGRATING ORAL EVERY 8 HOURS PRN
Qty: 10 TAB | Refills: 0 | Status: SHIPPED | OUTPATIENT
Start: 2018-09-07 | End: 2018-09-10

## 2018-09-07 RX ORDER — DICYCLOMINE HCL 20 MG
20 TABLET ORAL EVERY 6 HOURS
Qty: 20 TAB | Refills: 0 | Status: SHIPPED | OUTPATIENT
Start: 2018-09-07 | End: 2018-09-12

## 2018-09-07 NOTE — PATIENT INSTRUCTIONS
Hyperglycemia  Hyperglycemia is when the sugar (glucose) level in your blood is too high. It may not cause symptoms. If you do have symptoms, they may include warning signs, such as:  · Feeling more thirsty than normal.  · Hunger.  · Feeling tired.  · Needing to pee (urinate) more than normal.  · Blurry eyesight (vision).  You may get other symptoms as it gets worse, such as:  · Dry mouth.  · Not being hungry (loss of appetite).  · Fruity-smelling breath.  · Weakness.  · Weight gain or loss that is not planned. Weight loss may be fast.  · A tingling or numb feeling in your hands or feet.  · Headache.  · Skin that does not bounce back quickly when it is lightly pinched and released (poor skin turgor).  · Pain in your belly (abdomen).  · Cuts or bruises that heal slowly.  High blood sugar can happen to people who do or do not have diabetes. High blood sugar can happen slowly or quickly, and it can be an emergency.  Follow these instructions at home:  General instructions  · Take over-the-counter and prescription medicines only as told by your doctor.  · Do not use products that contain nicotine or tobacco, such as cigarettes and e-cigarettes. If you need help quitting, ask your doctor.  · Limit alcohol intake to no more than 1 drink per day for nonpregnant women and 2 drinks per day for men. One drink equals 12 oz of beer, 5 oz of wine, or 1½ oz of hard liquor.  · Manage stress. If you need help with this, ask your doctor.  · Keep all follow-up visits as told by your doctor. This is important.  Eating and drinking  · Stay at a healthy weight.  · Exercise regularly, as told by your doctor.  · Drink enough fluid, especially when you:  ¨ Exercise.  ¨ Get sick.  ¨ Are in hot temperatures.  · Eat healthy foods, such as:  ¨ Low-fat (lean) proteins.  ¨ Complex carbs (complex carbohydrates), such as whole wheat bread or brown rice.  ¨ Fresh fruits and vegetables.  ¨ Low-fat dairy products.  ¨ Healthy fats.  · Drink enough  fluid to keep your pee (urine) clear or pale yellow.  If you have diabetes:  · Make sure you know the symptoms of hyperglycemia.  · Follow your diabetes management plan, as told by your doctor. Make sure you:  ¨ Take insulin and medicines as told.  ¨ Follow your exercise plan.  ¨ Follow your meal plan. Eat on time. Do not skip meals.  ¨ Check your blood sugar as often as told. Make sure to check before and after exercise. If you exercise longer or in a different way than you normally do, check your blood sugar more often.  ¨ Follow your sick day plan whenever you cannot eat or drink normally. Make this plan ahead of time with your doctor.  · Share your diabetes management plan with people in your workplace, school, and household.  · Check your urine for ketones when you are ill and as told by your doctor.  · Carry a card or wear jewelry that says that you have diabetes.  Contact a doctor if:  · Your blood sugar level is higher than 240 mg/dL (13.3 mmol/L) for 2 days in a row.  · You have problems keeping your blood sugar in your target range.  · High blood sugar happens often for you.  Get help right away if:  · You have trouble breathing.  · You have a change in how you think, feel, or act (mental status).  · You feel sick to your stomach (nauseous), and that feeling does not go away.  · You cannot stop throwing up (vomiting).  These symptoms may be an emergency. Do not wait to see if the symptoms will go away. Get medical help right away. Call your local emergency services (911 in the [place] Do not drive yourself to the hospital.   Summary  · Hyperglycemia is when the sugar (glucose) level in your blood is too high.  · High blood sugar can happen to people who do or do not have diabetes.  · Make sure you drink enough fluids, eat healthy foods, and exercise regularly.  · Contact your doctor if you have problems keeping your blood sugar in your target range.  This information is not intended to replace advice given  to you by your health care provider. Make sure you discuss any questions you have with your health care provider.  Document Released: 10/15/2010 Document Revised: 09/04/2017 Document Reviewed: 09/04/2017  Elsevier Interactive Patient Education © 2017 [employer]

## 2018-09-09 NOTE — PROGRESS NOTES
Subjective:      Pt is a 42 y.o. male who presents with URI (x 5 days, emesis, fatigue, )            HPI  PT notes 5 days of NV and abd cramping and fatigue. Pt has not taken any Rx medications for this condition. Pt states the pain is a 3-4/10, aching in nature and worse at night. Pt denies CP, SOB, diarrhea, paresthesias, headaches, dizziness, change in vision, hives, or other joint pain. The pt's medication list, problem list, and allergies have been evaluated and reviewed during today's visit.    PMH:  Past Medical History:   Diagnosis Date   • Depression    • Hyperlipidemia    • Hypertension    • Type II or unspecified type diabetes mellitus without mention of complication, not stated as uncontrolled        PSH:  Past Surgical History:   Procedure Laterality Date   • TONSILLECTOMY         Fam Hx:    family history includes Cancer in his father; Diabetes in his sister; No Known Problems in his brother and mother.  Family Status   Relation Status   • Mo Alive   • Fa Alive   • Sis (Not Specified)   • Bro (Not Specified)       Soc HX:  Social History     Social History   • Marital status: Single     Spouse name: N/A   • Number of children: N/A   • Years of education: N/A     Occupational History   • Not on file.     Social History Main Topics   • Smoking status: Former Smoker     Types: Cigarettes     Quit date: 1/1/2006   • Smokeless tobacco: Never Used   • Alcohol use No   • Drug use: No   • Sexual activity: No     Other Topics Concern   • Not on file     Social History Narrative   • No narrative on file         Medications:    Current Outpatient Prescriptions:   •  ondansetron (ZOFRAN ODT) 4 MG TABLET DISPERSIBLE, Take 1 Tab by mouth every 8 hours as needed for up to 3 days., Disp: 10 Tab, Rfl: 0  •  dicyclomine (BENTYL) 20 MG Tab, Take 1 Tab by mouth every 6 hours for 5 days., Disp: 20 Tab, Rfl: 0  •  metformin (GLUCOPHAGE) 1000 MG tablet, TAKE 1 TAB BY MOUTH 2 TIMES A DAY, WITH MEALS., Disp: 60 Tab, Rfl: 0  •   "GLYXAMBI 10-5 MG Tab, TAKE 1 TAB BY MOUTH EVERY DAY, Disp: 30 Tab, Rfl: 0  •  lisinopril (PRINIVIL) 10 MG Tab, Take 1 Tab by mouth every day., Disp: 90 Tab, Rfl: 1  •  simvastatin (ZOCOR) 40 MG Tab, Take 1 Tab by mouth every evening., Disp: 90 Tab, Rfl: 1  •  fenofibrate (TRIGLIDE) 160 MG tablet, Take 1 Tab by mouth every day., Disp: 90 Tab, Rfl: 1  •  aspirin EC (ECOTRIN) 81 MG Tablet Delayed Response, Take 1 Tab by mouth every day., Disp: 100 Tab, Rfl: 11  •  Artificial Tear Solution (TEARS NATURALE FREE) 0.1-0.3 % Solution, Apply in the left eye every 1-2 drops while awake, Disp: 1 Each, Rfl: 0  •  artificial tears (LACRI-LUBE) Ointment ophthalmic ointment, Place 1 Inch in left eye every bedtime. For 3 weeks, Disp: 1 Tube, Rfl: 1      Allergies:  Patient has no known allergies.    ROS  Constitutional: Negative for fever, chills and malaise/fatigue.   HENT: Negative for congestion and sore throat.    Eyes: Negative for blurred vision, double vision and photophobia.   Respiratory: Negative for cough and shortness of breath.  Cardiovascular: Negative for chest pain and palpitations.   Gastrointestinal: POS nausea, vomiting, abdominal pain, NEG diarrhea and constipation.   Genitourinary: Negative for dysuria and flank pain.   Musculoskeletal: Negative for joint pain and myalgias.   Skin: Negative for itching and rash.   Neurological: Negative for dizziness, tingling and headaches.   Endo/Heme/Allergies: Does not bruise/bleed easily.   Psychiatric/Behavioral: Negative for depression. The patient is not nervous/anxious.           Objective:     /90   Pulse (!) 101   Temp 36.4 °C (97.6 °F)   Resp 18   Ht 1.778 m (5' 10\")   Wt 88 kg (194 lb)   SpO2 98%   BMI 27.84 kg/m²      Physical Exam   Abdominal: Soft. Normal appearance and bowel sounds are normal. He exhibits no shifting dullness, no distension, no pulsatile liver, no fluid wave, no abdominal bruit, no ascites, no pulsatile midline mass and no mass. " There is no hepatosplenomegaly. There is generalized tenderness. There is no rigidity, no rebound, no guarding, no CVA tenderness, no tenderness at McBurney's point and negative Osullivan's sign. No hernia.             Constitutional: PT is oriented to person, place, and time. PT appears well-developed and well-nourished. No distress.   HENT:   Head: Normocephalic and atraumatic.   Mouth/Throat: Oropharynx is clear and moist. No oropharyngeal exudate.   Eyes: Conjunctivae normal and EOM are normal. Pupils are equal, round, and reactive to light.   Neck: Normal range of motion. Neck supple. No thyromegaly present.   Cardiovascular: Normal rate, regular rhythm, normal heart sounds and intact distal pulses.  Exam reveals no gallop and no friction rub.    No murmur heard.  Pulmonary/Chest: Effort normal and breath sounds normal. No respiratory distress. PT has no wheezes. PT has no rales. Pt exhibits no tenderness.   Musculoskeletal: Normal range of motion. PT exhibits no edema and no tenderness.   Neurological: PT is alert and oriented to person, place, and time. PT has normal reflexes. No cranial nerve deficit.   Skin: Skin is warm and dry. No rash noted. PT is not diaphoretic. No erythema.       Psychiatric: PT has a normal mood and affect. PT behavior is normal. Judgment and thought content normal.     RADS:     Narrative       9/7/2018 1:01 PM    HISTORY/REASON FOR EXAM:  Gastrointestinal Complaint.  Nausea    TECHNIQUE/EXAM DESCRIPTION AND NUMBER OF VIEWS:  1 view(s) of the abdomen.    COMPARISON: None    FINDINGS:  The soft tissues and bony structures are unremarkable. There are no pathologic calcifications. Allograft the bowel gas pattern is unremarkable.   Impression       Negative AP abdomen.   Reading Provider Reading Date   Lyndon Jarvis M.D. Sep 7, 2018   Signing Provider Signing Date Signing Time   Lyndon Jarvis M.D. Sep 7, 2018  1:13 PM       Assessment/Plan:     1. Hyperglycemia      2. Bilious vomiting  with nausea    - POCT Glucose-->305  - hyoscyamine-maalox plus-lidocaine viscous (GI COCKTAIL); Take 30 mL by mouth Once for 1 dose.  Dispense: 30 mL; Refill: 0  - ondansetron (ZOFRAN ODT) 4 MG TABLET DISPERSIBLE; Take 1 Tab by mouth every 8 hours as needed for up to 3 days.  Dispense: 10 Tab; Refill: 0  - [id number] VIEW; Future    3. Abdominal cramping    - hyoscyamine-maalox plus-lidocaine viscous (GI COCKTAIL); Take 30 mL by mouth Once for 1 dose.  Dispense: 30 mL; Refill: 0  - dicyclomine (BENTYL) 20 MG Tab; Take 1 Tab by mouth every 6 hours for 5 days.  Dispense: 20 Tab; Refill: 0  - [id number] VIEW; Future    STRICT ER precautions: concern for DKA  Pt does not wish to go to the ER but will try outpatient therapy first  Pt has hx of poor glucose control and had gone to the ER in the past for this issue  Rest, fluids encouraged.  Note given for work.  AVS with medical info given.  Pt was in full understanding and agreement with the plan.  Follow-up as needed if symptoms worsen or fail to improve.

## 2018-10-04 ENCOUNTER — APPOINTMENT (OUTPATIENT)
Dept: RADIOLOGY | Facility: IMAGING CENTER | Age: 43
End: 2018-10-04
Attending: NURSE PRACTITIONER
Payer: COMMERCIAL

## 2018-10-04 ENCOUNTER — OFFICE VISIT (OUTPATIENT)
Dept: URGENT CARE | Facility: CLINIC | Age: 43
End: 2018-10-04
Payer: COMMERCIAL

## 2018-10-04 VITALS
HEART RATE: 81 BPM | DIASTOLIC BLOOD PRESSURE: 68 MMHG | HEIGHT: 70 IN | OXYGEN SATURATION: 97 % | RESPIRATION RATE: 18 BRPM | SYSTOLIC BLOOD PRESSURE: 104 MMHG | TEMPERATURE: 97.6 F

## 2018-10-04 DIAGNOSIS — M25.511 ACUTE PAIN OF RIGHT SHOULDER: ICD-10-CM

## 2018-10-04 PROCEDURE — 99213 OFFICE O/P EST LOW 20 MIN: CPT | Performed by: NURSE PRACTITIONER

## 2018-10-04 PROCEDURE — 73030 X-RAY EXAM OF SHOULDER: CPT | Mod: 26,RT | Performed by: NURSE PRACTITIONER

## 2018-10-04 ASSESSMENT — ENCOUNTER SYMPTOMS
FEVER: 0
CHILLS: 0

## 2018-10-04 NOTE — PROGRESS NOTES
Subjective:      Chino Diaz is a 42 y.o. male who presents with Shoulder Pain (right shoulder x 2 months been constant pain)    Past Medical History:   Diagnosis Date   • Depression    • Hyperlipidemia    • Hypertension    • Type II or unspecified type diabetes mellitus without mention of complication, not stated as uncontrolled      Social History     Social History   • Marital status: Single     Spouse name: N/A   • Number of children: N/A   • Years of education: N/A     Occupational History   • Not on file.     Social History Main Topics   • Smoking status: Former Smoker     Types: Cigarettes     Quit date: 1/1/2006   • Smokeless tobacco: Never Used   • Alcohol use No   • Drug use: No   • Sexual activity: No     Other Topics Concern   • Not on file     Social History Narrative   • No narrative on file     Family History   Problem Relation Age of Onset   • No Known Problems Mother    • Cancer Father         lymphoma   • Diabetes Sister    • No Known Problems Brother        Allergies: Patient has no known allergies.    Patient is a 42-year-old male who presents today with complaint of chronic shoulder pain to the right shoulder over the last 4 months.  He does not associate this with any specific injury.  He states over time he has had increasing pain especially with range of motion greater than 90 degrees.  No numbness or tingling in the upper extremity, no weakness.          Shoulder Pain   This is a new problem. The current episode started more than 1 month ago. The problem occurs constantly. The problem has been unchanged. Pertinent negatives include no chills or fever. Nothing aggravates the symptoms. He has tried nothing for the symptoms. The treatment provided no relief.       Review of Systems   Constitutional: Positive for malaise/fatigue. Negative for chills and fever.   Musculoskeletal:        Right shoulder pain   Skin: Negative.    All other systems reviewed and are negative.          "Objective:     /68 (BP Location: Left arm, Patient Position: Sitting, BP Cuff Size: Adult)   Pulse 81   Temp 36.4 °C (97.6 °F) (Temporal)   Resp 18   Ht 1.778 m (5' 10\")   SpO2 97%      Physical Exam   Constitutional: He is oriented to person, place, and time. He appears well-developed and well-nourished.   Musculoskeletal:        Right shoulder: He exhibits pain. He exhibits normal range of motion and no tenderness.        Arms:  There is no palpable crepitus over the right shoulder.  Patient has almost full range of motion except with extreme abduction.  He does report pain in the shoulder with abduction greater than 90 degrees anterior and lateral.   are 5/5 and equal in the upper extremities.  Strength is 5/5 and equal in the upper extremities.   Neurological: He is alert and oriented to person, place, and time.   Skin: Skin is warm and dry. Capillary refill takes less than 2 seconds.   Psychiatric: He has a normal mood and affect. His behavior is normal. Judgment and thought content normal.   Vitals reviewed.    XR shoulder :     TECHNIQUE/EXAM DESCRIPTION AND NUMBER OF VIEWS:  3 views of the RIGHT shoulder.    COMPARISON: None    FINDINGS:  There is no evidence of displaced  fracture or dislocation.    There is no significant degenerative change.    The visualized lung parenchyma is clear.    There are no displaced rib fractures in this field-of-view.      Impression       1.  Unremarkable right shoulder series.                 Assessment/Plan:   Chronic shoulder pain    -ice PRN  -ibuprofen PRN  -Conisder MRI for persistent pain; patient states he will obtain this order from his PCP  -consider referral to orthopedics for follow up if indicated  There are no diagnoses linked to this encounter.      "

## 2018-10-05 DIAGNOSIS — E11.9 CONTROLLED TYPE 2 DIABETES MELLITUS WITHOUT COMPLICATION, WITHOUT LONG-TERM CURRENT USE OF INSULIN (HCC): ICD-10-CM

## 2018-10-05 RX ORDER — EMPAGLIFLOZIN AND LINAGLIPTIN 10; 5 MG/1; MG/1
TABLET, FILM COATED ORAL
Qty: 30 TAB | Refills: 0 | Status: SHIPPED | OUTPATIENT
Start: 2018-10-05 | End: 2018-11-05 | Stop reason: SDUPTHER

## 2018-10-27 LAB
ALBUMIN SERPL-MCNC: 4.4 G/DL (ref 3.5–5.5)
ALBUMIN/GLOB SERPL: 2 {RATIO} (ref 1.2–2.2)
ALP SERPL-CCNC: 66 IU/L (ref 39–117)
ALT SERPL-CCNC: 15 IU/L (ref 0–44)
AST SERPL-CCNC: 14 IU/L (ref 0–40)
BILIRUB SERPL-MCNC: 0.5 MG/DL (ref 0–1.2)
BUN SERPL-MCNC: 12 MG/DL (ref 6–24)
BUN/CREAT SERPL: 13 (ref 9–20)
CALCIUM SERPL-MCNC: 9.1 MG/DL (ref 8.7–10.2)
CHLORIDE SERPL-SCNC: 105 MMOL/L (ref 96–106)
CHOLEST SERPL-MCNC: 181 MG/DL (ref 100–199)
CO2 SERPL-SCNC: 20 MMOL/L (ref 20–29)
CREAT SERPL-MCNC: 0.89 MG/DL (ref 0.76–1.27)
GLOBULIN SER CALC-MCNC: 2.2 G/DL (ref 1.5–4.5)
GLUCOSE SERPL-MCNC: 168 MG/DL (ref 65–99)
HBA1C MFR BLD: 7.6 % (ref 4.8–5.6)
HDLC SERPL-MCNC: 22 MG/DL
IF AFRICAN AMERICAN  100797: 120 ML/MIN/1.73
IF NON AFRICAN AMER 100791: 104 ML/MIN/1.73
LABORATORY COMMENT REPORT: ABNORMAL
LDLC SERPL CALC-MCNC: ABNORMAL MG/DL (ref 0–99)
POTASSIUM SERPL-SCNC: 3.9 MMOL/L (ref 3.5–5.2)
PROT SERPL-MCNC: 6.6 G/DL (ref 6–8.5)
SODIUM SERPL-SCNC: 140 MMOL/L (ref 134–144)
TRIGL SERPL-MCNC: 675 MG/DL (ref 0–149)
VLDLC SERPL CALC-MCNC: ABNORMAL MG/DL (ref 5–40)

## 2018-10-29 ENCOUNTER — APPOINTMENT (OUTPATIENT)
Dept: MEDICAL GROUP | Facility: MEDICAL CENTER | Age: 43
End: 2018-10-29
Payer: COMMERCIAL

## 2018-11-01 ENCOUNTER — OFFICE VISIT (OUTPATIENT)
Dept: MEDICAL GROUP | Facility: MEDICAL CENTER | Age: 43
End: 2018-11-01
Payer: COMMERCIAL

## 2018-11-01 VITALS
HEIGHT: 70 IN | BODY MASS INDEX: 27.11 KG/M2 | SYSTOLIC BLOOD PRESSURE: 122 MMHG | TEMPERATURE: 98.8 F | HEART RATE: 91 BPM | DIASTOLIC BLOOD PRESSURE: 82 MMHG | WEIGHT: 189.38 LBS | OXYGEN SATURATION: 96 %

## 2018-11-01 DIAGNOSIS — I10 ESSENTIAL HYPERTENSION: ICD-10-CM

## 2018-11-01 DIAGNOSIS — M79.10 MYALGIA: ICD-10-CM

## 2018-11-01 DIAGNOSIS — M62.838 MUSCLE SPASM: ICD-10-CM

## 2018-11-01 DIAGNOSIS — N48.89 PENILE CURVATURE, ACQUIRED: ICD-10-CM

## 2018-11-01 DIAGNOSIS — M25.511 RIGHT SHOULDER PAIN, UNSPECIFIED CHRONICITY: ICD-10-CM

## 2018-11-01 DIAGNOSIS — E78.5 DYSLIPIDEMIA: ICD-10-CM

## 2018-11-01 PROCEDURE — 99214 OFFICE O/P EST MOD 30 MIN: CPT | Performed by: INTERNAL MEDICINE

## 2018-11-01 RX ORDER — PREDNISONE 10 MG/1
TABLET ORAL
Qty: 15 TAB | Refills: 0 | Status: SHIPPED | OUTPATIENT
Start: 2018-11-01 | End: 2018-11-07

## 2018-11-01 RX ORDER — LISINOPRIL 10 MG/1
10 TABLET ORAL DAILY
Qty: 90 TAB | Refills: 0 | Status: SHIPPED | OUTPATIENT
Start: 2018-11-01 | End: 2019-01-28 | Stop reason: SDUPTHER

## 2018-11-01 RX ORDER — SIMVASTATIN 40 MG
40 TABLET ORAL EVERY EVENING
Qty: 90 TAB | Refills: 1 | Status: SHIPPED | OUTPATIENT
Start: 2018-11-01 | End: 2019-02-06

## 2018-11-01 RX ORDER — MELOXICAM 15 MG/1
15 TABLET ORAL DAILY
Qty: 60 TAB | Refills: 0 | Status: SHIPPED | OUTPATIENT
Start: 2018-11-01 | End: 2019-01-03 | Stop reason: SDUPTHER

## 2018-11-01 RX ORDER — FENOFIBRATE 160 MG/1
160 TABLET ORAL DAILY
Qty: 90 TAB | Refills: 1 | Status: SHIPPED | OUTPATIENT
Start: 2018-11-01 | End: 2019-02-06 | Stop reason: SDUPTHER

## 2018-11-01 ASSESSMENT — PATIENT HEALTH QUESTIONNAIRE - PHQ9: CLINICAL INTERPRETATION OF PHQ2 SCORE: 0

## 2018-11-01 NOTE — PROGRESS NOTES
CHIEF COMPLIANT:   Chief Complaint   Patient presents with   • Diabetes     lab results, DM     Chino Diaz is a 42 y.o. male here for DM follow up    DIABETES MELLITUS TYPE 2  Onset/D  Diabetes education:  Y     Medications:   • Metformin:  1000 mg BID   • ACE/ARB: lisinopril  • Statin: simvastatin 20 mg QD  • ASA: Y  Compliant with medications: yes  Checking feet daily/wear soft socks/shoes: advised     Diabetes ABCDE TARGETS  • A1c, last:  pending  • Fingersticks:  N  • Mean BS:  NA  • Hypoglycemia:  No  - No symptoms of hypoglycemia: tremors, hunger, sometimes dizzy  • Blood Pressure, goal < 130/80: yes  • Cholesterol-Lipid Panel: very high  • Dysalbuminuria:  neg     Diet: decreased carbs  Exercise:  N  BMI:  27     DM complications:  • Peripheral neuropathy:  No numbness or tingling sensation in the feet.  • Retinopathy:                    Last eye exam: , pending records. No evidence of retinopathy.    • Nephropathy:                     Neg  • CVS:                                 No CAD symptoms (CP/pressure, exertional dyspnea, edema).    • GI:                                   No gastropathy sx (nausea/vomiting).     FH of DM: sister     Hypertension  Meds: lisinopril, 10 mg daily; taking as prescribed.   He is not measuring BP at home.  Denies:  -  headaches, vision problems, tinnitus.                 -  chest pain/pressure, palpitations, irregular heart beats, exertional, dyspnea, peripheral edema.  Low salt diet: N  Diet / exercise / BMI: as above.   FH of HTN: father     Hyperlipidemia  Statin:  Simvastatin, 20 mg daily, taking as prescribed. No muscle weakness, cramps, nausea,abdominal discomfort.   Diet / exercise / BMI: as above.   FH: mother    Right shoulder pain  - Onset: > 1 month ago  - Triger: No trauma  - located in: Muscles posterior to right shoulder, lateral and upper scapula on the right side  - intensity:  mild to moderate  - quality:  dull   - radiation:  no  -  alleviating factors are:  rest  -  exacerbating factors are:  activity  - accompanied:    - TTP over muscles   - no numbness, weakness, tingling, fever, chills  - course: Persistent    Reviewed PMH, PSH, FH, SH, ALL, IMM, MEDS.     Current medicines (including changes today)  Current Outpatient Prescriptions   Medication Sig Dispense Refill   • meloxicam (MOBIC) 15 MG tablet Take 1 Tab by mouth every day. 60 Tab 0   • predniSONE (DELTASONE) 10 MG Tab 5 po today then 4 po in am then 3 po in am then 2 po in am then 1 po in am then stop. 15 Tab 0   • metformin (GLUCOPHAGE) 1000 MG tablet Take 1 Tab by mouth 2 times a day, with meals. 180 Tab 0   • lisinopril (PRINIVIL) 10 MG Tab Take 1 Tab by mouth every day. 90 Tab 0   • simvastatin (ZOCOR) 40 MG Tab Take 1 Tab by mouth every evening. 90 Tab 1   • fenofibrate (TRIGLIDE) 160 MG tablet Take 1 Tab by mouth every day. 90 Tab 1   • GLYXAMBI 10-5 MG Tab TAKE 1 TABLET BY MOUTH EVERY DAY 30 Tab 0   • aspirin EC (ECOTRIN) 81 MG Tablet Delayed Response Take 1 Tab by mouth every day. 100 Tab 11   • Artificial Tear Solution (TEARS NATURALE FREE) 0.1-0.3 % Solution Apply in the left eye every 1-2 drops while awake 1 Each 0   • artificial tears (LACRI-LUBE) Ointment ophthalmic ointment Place 1 Inch in left eye every bedtime. For 3 weeks 1 Tube 1     No current facility-administered medications for this visit.      Allergies: Patient has no known allergies.  He  has a past medical history of Depression; Hyperlipidemia; Hypertension; and Type II or unspecified type diabetes mellitus without mention of complication, not stated as uncontrolled.  He  has a past surgical history that includes tonsillectomy.  Social History   Substance Use Topics   • Smoking status: Former Smoker     Types: Cigarettes     Quit date: 1/1/2006   • Smokeless tobacco: Never Used   • Alcohol use No     Social History     Social History Narrative   • No narrative on file     Family History   Problem Relation  "Age of Onset   • No Known Problems Mother    • Cancer Father         lymphoma   • Diabetes Sister    • No Known Problems Brother      Family Status   Relation Status   • Mo Alive   • Fa Alive   • Sis (Not Specified)   • Bro (Not Specified)     ROS   Constitutional: Negative for fever, chills and weight loss.   HEENT: Negative for blurred vision, sore throat, swollen glands. No hearing loss or vertigo.  Respiratory: Negative for cough, wheezing, shortness of breath.   CVS: Negative for chest pain, palpitations, irregular heart beats, exertional dyspnea.   GI: Negative for heartburn, abdominal pain, nausea, vomiting, change in BMs.   : Negative for dysuria, polyuria.   MS: As above.  Neuro: no headaches, numbness, weakness, seizures.   Skin: no skin lesions, rash.  Endocrine: per HPI.     PHYSICAL EXAM   Blood pressure 122/82, pulse 91, temperature 37.1 °C (98.8 °F), temperature source Temporal, height 1.778 m (5' 10\"), weight 85.9 kg (189 lb 6 oz), SpO2 96 %. Body mass index is 27.17 kg/m².  Alert, oriented in no acute distress.  Eye contact is good, speech goal directed, affect bright.  HEENT: EOMI, PERRL, conjunctiva non-injected, sclera non-icteric.  Nares patent with no significant congestion or drainage.  Normal pinnae, external auditory canals, TM pearly gray with normal light reflex bilaterally.  Oral mucous membranes pink and moist with no lesions.  Neck supple with no cervical lymphadenopathy, JVD, palpable thyroid nodules or carotid bruits.  Lungs: clear to auscultation bilaterally with good excursion.  CV: regular rate and rhythm, without murmur, rubs, thrills, or gallops.  Abdomen: soft, non-distended, non-tender with normal bowel sounds. No CVAT, No masses, or organomegaly.  Lower extremities: color normal, vascularity normal, no edema, temperature normal  Musculoskeletal: Normal gait.  Muscle spasm and tenderness to palpation over the muscles in the area of the right shoulder/scapula.  Psych: Normal " mood and affect. Alert and oriented x3. Judgment and insight is normal.  Neuro:speech normal, mental status intact, cranial nerves 2-12 intact, gait, including heel, toe, and tandem walking normal, muscle tone normal, muscle strength normal, sensation to light touch and pinprick normal, reflexes normal and symmetric  DM foot exam: intact to pin prick, bilaterally.  Dorsalis pedis pulse is +4/4 bilaterally. No redness, ulcers, calluses.     LABS     Results reviewed and discussed with the patient, questions answered.    Last labs:  Lab Results   Component Value Date/Time    CHOLSTRLTOT 181 10/26/2018 09:59 AM    LDL Comment 10/26/2018 09:59 AM    HDL 22 (L) 10/26/2018 09:59 AM    TRIGLYCERIDE 675 (HH) 10/26/2018 09:59 AM       Lab Results   Component Value Date/Time    SODIUM 140 10/26/2018 09:59 AM    SODIUM 136 06/04/2017 12:14 PM    POTASSIUM 3.9 10/26/2018 09:59 AM    POTASSIUM 4.9 06/04/2017 12:14 PM    CHLORIDE 105 10/26/2018 09:59 AM    CHLORIDE 103 06/04/2017 12:14 PM    CO2 20 10/26/2018 09:59 AM    CO2 11 (L) 06/04/2017 12:14 PM    GLUCOSE 168 (H) 10/26/2018 09:59 AM    GLUCOSE 364 (H) 06/04/2017 12:14 PM    BUN 12 10/26/2018 09:59 AM    BUN 18 06/04/2017 12:14 PM    CREATININE 0.89 10/26/2018 09:59 AM    CREATININE 1.10 06/04/2017 12:14 PM    BUNCREATRAT 13 10/26/2018 09:59 AM     Lab Results   Component Value Date/Time    ALKPHOSPHAT 66 10/26/2018 09:59 AM    ALKPHOSPHAT 112 (H) 06/04/2017 12:14 PM    ASTSGOT 14 10/26/2018 09:59 AM    ASTSGOT 13 06/04/2017 12:14 PM    ALTSGPT 15 10/26/2018 09:59 AM    ALTSGPT 28 06/04/2017 12:14 PM    TBILIRUBIN 0.5 10/26/2018 09:59 AM    TBILIRUBIN 0.6 06/04/2017 12:14 PM      Lab Results   Component Value Date/Time    HBA1C 7.6 (H) 10/26/2018 09:59 AM    HBA1C 7.0 (H) 12/27/2017 10:29 AM     No results found for: TSH  No results found for: FREET4    Lab Results   Component Value Date/Time    WBC 14.9 (H) 06/04/2017 12:14 PM    RBC 5.88 06/04/2017 12:14 PM    HEMOGLOBIN  18.3 (H) 06/04/2017 12:14 PM    HEMATOCRIT 53.8 (H) 06/04/2017 12:14 PM    MCV 91.5 06/04/2017 12:14 PM    MCH 31.1 06/04/2017 12:14 PM    MCHC 34.0 06/04/2017 12:14 PM    MPV 9.3 06/04/2017 12:14 PM    NEUTSPOLYS 82.80 (H) 06/04/2017 12:14 PM    LYMPHOCYTES 10.70 (L) 06/04/2017 12:14 PM    MONOCYTES 4.00 06/04/2017 12:14 PM    EOSINOPHILS 0.10 06/04/2017 12:14 PM    BASOPHILS 1.00 06/04/2017 12:14 PM        ASSESMENT AND PLAN     1. Uncontrolled type 2 diabetes mellitus without complication, without long-term current use of insulin (HCC)  The patient was not compliant with medications and office visits.     - COMP METABOLIC PANEL; Future  - HEMOGLOBIN A1C; Future  - MICROALBUMIN CREAT RATIO URINE; Future  - metformin (GLUCOPHAGE) 1000 MG tablet; Take 1 Tab by mouth 2 times a day, with meals.  Dispense: 180 Tab; Refill: 0    2. Essential hypertension  Controlled, continue current treatment  - COMP METABOLIC PANEL; Future  - lisinopril (PRINIVIL) 10 MG Tab; Take 1 Tab by mouth every day.  Dispense: 90 Tab; Refill: 0    3. Dyslipidemia  Still high triglycerides, despite maximum dose of fenofibrate; continue current treatment.   Advised low calorie diet, daily exercise  - COMP METABOLIC PANEL; Future  - LIPID PROFILE; Future  - simvastatin (ZOCOR) 40 MG Tab; Take 1 Tab by mouth every evening.  Dispense: 90 Tab; Refill: 1  - fenofibrate (TRIGLIDE) 160 MG tablet; Take 1 Tab by mouth every day.  Dispense: 90 Tab; Refill: 1    4. Right shoulder pain, unspecified chronicity  - REFERRAL TO PHYSICAL THERAPY Reason for Therapy: Eval/Treat/Report  - meloxicam (MOBIC) 15 MG tablet; Take 1 Tab by mouth every day.  Dispense: 60 Tab; Refill: 0  - predniSONE (DELTASONE) 10 MG Tab; 5 po today then 4 po in am then 3 po in am then 2 po in am then 1 po in am then stop.  Dispense: 15 Tab; Refill: 0  5. Muscle spasm  - REFERRAL TO PHYSICAL THERAPY Reason for Therapy: Eval/Treat/Report  - meloxicam (MOBIC) 15 MG tablet; Take 1 Tab by mouth  every day.  Dispense: 60 Tab; Refill: 0  - predniSONE (DELTASONE) 10 MG Tab; 5 po today then 4 po in am then 3 po in am then 2 po in am then 1 po in am then stop.  Dispense: 15 Tab; Refill: 0  6. Myalgia  - REFERRAL TO PHYSICAL THERAPY Reason for Therapy: Eval/Treat/Report  - meloxicam (MOBIC) 15 MG tablet; Take 1 Tab by mouth every day.  Dispense: 60 Tab; Refill: 0  - predniSONE (DELTASONE) 10 MG Tab; 5 po today then 4 po in am then 3 po in am then 2 po in am then 1 po in am then stop.  Dispense: 15 Tab; Refill: 0    7. Penile curvature, acquired  Requested referral.  - REFERRAL TO UROLOGY    Health Maintenance: Completed    All questions are answered.    Smoking Counseling: Nonsmoker    Followup: Return in about 3 months (around 2/1/2019), or if symptoms worsen or fail to improve, for Short.

## 2018-11-05 DIAGNOSIS — E11.9 CONTROLLED TYPE 2 DIABETES MELLITUS WITHOUT COMPLICATION, WITHOUT LONG-TERM CURRENT USE OF INSULIN (HCC): ICD-10-CM

## 2018-11-05 RX ORDER — EMPAGLIFLOZIN AND LINAGLIPTIN 10; 5 MG/1; MG/1
TABLET, FILM COATED ORAL
Qty: 90 TAB | Refills: 0 | Status: SHIPPED | OUTPATIENT
Start: 2018-11-05 | End: 2019-01-23 | Stop reason: SDUPTHER

## 2019-01-03 DIAGNOSIS — M25.511 RIGHT SHOULDER PAIN, UNSPECIFIED CHRONICITY: ICD-10-CM

## 2019-01-03 DIAGNOSIS — M62.838 MUSCLE SPASM: ICD-10-CM

## 2019-01-03 DIAGNOSIS — M79.10 MYALGIA: ICD-10-CM

## 2019-01-03 RX ORDER — MELOXICAM 15 MG/1
TABLET ORAL
Qty: 60 TAB | Refills: 0 | Status: SHIPPED | OUTPATIENT
Start: 2019-01-03 | End: 2019-03-12

## 2019-01-16 ENCOUNTER — TELEPHONE (OUTPATIENT)
Dept: MEDICAL GROUP | Facility: MEDICAL CENTER | Age: 44
End: 2019-01-16

## 2019-01-16 NOTE — TELEPHONE ENCOUNTER
1. Caller Name: [facility]                                         Call Back Number: [phone]      Patient approves a detailed voicemail message: no    Meloxicam- can pt have 90 day prescription? Per pharmacy request for insurance purposes

## 2019-01-23 DIAGNOSIS — E11.9 CONTROLLED TYPE 2 DIABETES MELLITUS WITHOUT COMPLICATION, WITHOUT LONG-TERM CURRENT USE OF INSULIN (HCC): ICD-10-CM

## 2019-01-23 RX ORDER — EMPAGLIFLOZIN AND LINAGLIPTIN 10; 5 MG/1; MG/1
TABLET, FILM COATED ORAL
Qty: 90 TAB | Refills: 0 | Status: SHIPPED | OUTPATIENT
Start: 2019-01-23 | End: 2019-01-31 | Stop reason: SDUPTHER

## 2019-01-28 DIAGNOSIS — I10 ESSENTIAL HYPERTENSION: ICD-10-CM

## 2019-01-28 RX ORDER — LISINOPRIL 10 MG/1
TABLET ORAL
Qty: 90 TAB | Refills: 0 | Status: SHIPPED | OUTPATIENT
Start: 2019-01-28 | End: 2019-02-06 | Stop reason: SDUPTHER

## 2019-01-31 DIAGNOSIS — E11.9 CONTROLLED TYPE 2 DIABETES MELLITUS WITHOUT COMPLICATION, WITHOUT LONG-TERM CURRENT USE OF INSULIN (HCC): ICD-10-CM

## 2019-01-31 NOTE — TELEPHONE ENCOUNTER
----- Message from Jenna Gatica sent at 1/31/2019 11:20 AM PST -----  1. Caller Name: Chino Diaz                            2. Call Back Number: [phone] (home)     3. Patient PCP: dr. [email or web address]    4. What is the patient requesting: Patient dropped bottle in sink and needs a small fill to get by until his appt next week. Thank you  GLYXAMBI 10-5 MG Tab    5. Does patient need immediate contact: no

## 2019-02-02 LAB
ALBUMIN SERPL-MCNC: 3.9 G/DL (ref 3.5–5.5)
ALBUMIN/CREAT UR: 51.4 MG/G CREAT (ref 0–30)
ALBUMIN/GLOB SERPL: 1.6 {RATIO} (ref 1.2–2.2)
ALP SERPL-CCNC: 47 IU/L (ref 39–117)
ALT SERPL-CCNC: 15 IU/L (ref 0–44)
AST SERPL-CCNC: 12 IU/L (ref 0–40)
BILIRUB SERPL-MCNC: 0.3 MG/DL (ref 0–1.2)
BUN SERPL-MCNC: 12 MG/DL (ref 6–24)
BUN/CREAT SERPL: 15 (ref 9–20)
CALCIUM SERPL-MCNC: 8.8 MG/DL (ref 8.7–10.2)
CHLORIDE SERPL-SCNC: 95 MMOL/L (ref 96–106)
CHOLEST SERPL-MCNC: 463 MG/DL (ref 100–199)
CO2 SERPL-SCNC: 18 MMOL/L (ref 20–29)
CREAT SERPL-MCNC: 0.8 MG/DL (ref 0.76–1.27)
CREAT UR-MCNC: 88.6 MG/DL
GLOBULIN SER CALC-MCNC: 2.5 G/DL (ref 1.5–4.5)
GLUCOSE SERPL-MCNC: 259 MG/DL (ref 65–99)
HBA1C MFR BLD: 9 % (ref 4.8–5.6)
HDLC SERPL-MCNC: 14 MG/DL
LABORATORY COMMENT REPORT: ABNORMAL
LDLC SERPL CALC-MCNC: ABNORMAL MG/DL (ref 0–99)
MICROALBUMIN UR-MCNC: 45.5 UG/ML
POTASSIUM SERPL-SCNC: 4.3 MMOL/L (ref 3.5–5.2)
PROT SERPL-MCNC: 6.4 G/DL (ref 6–8.5)
SODIUM SERPL-SCNC: 129 MMOL/L (ref 134–144)
TRIGL SERPL-MCNC: 3783 MG/DL (ref 0–149)
VLDLC SERPL CALC-MCNC: ABNORMAL MG/DL (ref 5–40)

## 2019-02-06 ENCOUNTER — OFFICE VISIT (OUTPATIENT)
Dept: MEDICAL GROUP | Facility: MEDICAL CENTER | Age: 44
End: 2019-02-06
Payer: COMMERCIAL

## 2019-02-06 ENCOUNTER — TELEPHONE (OUTPATIENT)
Dept: MEDICAL GROUP | Facility: MEDICAL CENTER | Age: 44
End: 2019-02-06

## 2019-02-06 VITALS
WEIGHT: 195.77 LBS | BODY MASS INDEX: 28.03 KG/M2 | HEIGHT: 70 IN | OXYGEN SATURATION: 98 % | TEMPERATURE: 97.1 F | SYSTOLIC BLOOD PRESSURE: 118 MMHG | HEART RATE: 74 BPM | DIASTOLIC BLOOD PRESSURE: 78 MMHG

## 2019-02-06 DIAGNOSIS — I10 ESSENTIAL HYPERTENSION: ICD-10-CM

## 2019-02-06 DIAGNOSIS — Z00.00 HEALTH CARE MAINTENANCE: ICD-10-CM

## 2019-02-06 DIAGNOSIS — H50.9 STRABISMUS: ICD-10-CM

## 2019-02-06 DIAGNOSIS — E78.5 DYSLIPIDEMIA: ICD-10-CM

## 2019-02-06 PROCEDURE — 99214 OFFICE O/P EST MOD 30 MIN: CPT | Performed by: INTERNAL MEDICINE

## 2019-02-06 RX ORDER — LISINOPRIL 10 MG/1
10 TABLET ORAL
Qty: 90 TAB | Refills: 0 | Status: SHIPPED | OUTPATIENT
Start: 2019-02-06 | End: 2019-06-04 | Stop reason: SDUPTHER

## 2019-02-06 RX ORDER — FENOFIBRATE 160 MG/1
160 TABLET ORAL DAILY
Qty: 90 TAB | Refills: 3 | Status: SHIPPED | OUTPATIENT
Start: 2019-02-06 | End: 2020-02-19 | Stop reason: SDUPTHER

## 2019-02-06 RX ORDER — FENOFIBRATE 160 MG/1
TABLET ORAL
Refills: 1 | COMMUNITY
Start: 2019-01-28 | End: 2019-03-12

## 2019-02-06 RX ORDER — ATORVASTATIN CALCIUM 80 MG/1
80 TABLET, FILM COATED ORAL EVERY EVENING
Qty: 90 TAB | Refills: 0 | Status: SHIPPED | OUTPATIENT
Start: 2019-02-06 | End: 2019-05-05 | Stop reason: SDUPTHER

## 2019-02-06 RX ORDER — FENOFIBRATE 160 MG/1
TABLET ORAL
COMMUNITY
Start: 2019-01-28 | End: 2019-03-12

## 2019-02-06 ASSESSMENT — PATIENT HEALTH QUESTIONNAIRE - PHQ9: CLINICAL INTERPRETATION OF PHQ2 SCORE: 0

## 2019-02-06 NOTE — PROGRESS NOTES
CHIEF COMPLIANT:   Chief Complaint   Patient presents with   • Diabetes     Chino Diaz is a 43 y.o. male here for DM follow up      DIABETES MELLITUS TYPE 2  Onset/D  Diabetes education:  Y     Medications:   • Metformin:  1000 mg BID  •  Empagliflozin-Linagliptin (GLYXAMBI) 10-5 MG Tab  • ACE/ARB: lisinopril  • Statin: simvastatin 20 mg QD  • ASA: Y  Compliant with medications: yes  Checking feet daily/wear soft socks/shoes: advised     Diabetes ABCDE TARGETS  • A1c, last:  9.0  • Fingersticks:  N  • Mean BS:  NA  • Hypoglycemia:  No  - No symptoms of hypoglycemia: tremors, hunger, sometimes dizzy  • Blood Pressure, goal < 130/80: yes  • Cholesterol-Lipid Panel: very high  • Dysalbuminuria:  neg     Diet: decreased carbs  Exercise:  N  BMI: 28     DM complications:  • Peripheral neuropathy:            No numbness or tingling sensation in the feet.  • Retinopathy:                       Last eye exam: 2018, pending records. No evidence of retinopathy.    • Nephropathy:                      Neg  • CVS:                                     No CAD symptoms (CP/pressure, exertional dyspnea, edema).    • GI:                                         No gastropathy sx (nausea/vomiting).     FH of DM: sister     Hypertension  Meds: lisinopril, 10 mg daily; taking as prescribed.   He is not measuring BP at home.  Denies:  -  headaches, vision problems, tinnitus.                 -  chest pain/pressure, palpitations, irregular heart beats, exertional, dyspnea, peripheral edema.  Low salt diet: N  Diet / exercise / BMI: as above.   FH of HTN: father     Hyperlipidemia  Statin:  Simvastatin, 20 mg daily, taking as prescribed. No muscle weakness, cramps, nausea,abdominal discomfort.   Diet / exercise / BMI: as above.   FH: mother    Strabismus  He has right strabismus.    Reviewed PMH, PSH, FH, SH, ALL, IMM, MEDS.     Current medicines (including changes today)  Current Outpatient Prescriptions   Medication Sig  Dispense Refill   • atorvastatin (LIPITOR) 80 MG tablet Take 1 Tab by mouth every evening. 90 Tab 0   • Empagliflozin (JARDIANCE) 25 MG Tab Take 25 mg by mouth every day. 90 Tab 0   • Dulaglutide 0.75 MG/0.5ML Solution Pen-injector Inject 0.5 mL as instructed every 7 days. 2 mL 0   • Blood Glucose Test Strips TeTest strips for PATIENT meter. Sig: use DAILY and prn ssx high or low sugar #100 RF x 3 1 Package 3   • lisinopril (PRINIVIL) 10 MG Tab Take 1 Tab by mouth every day. 90 Tab 0   • metformin (GLUCOPHAGE) 1000 MG tablet Take 1 Tab by mouth 2 times a day, with meals. 180 Tab 0   • fenofibrate (TRIGLIDE) 160 MG tablet Take 1 Tab by mouth every day. 90 Tab 3   • meloxicam (MOBIC) 15 MG tablet TAKE 1 TABLET BY MOUTH EVERY DAY 60 Tab 0   • aspirin EC (ECOTRIN) 81 MG Tablet Delayed Response Take 1 Tab by mouth every day. 100 Tab 11   • Artificial Tear Solution (TEARS NATURALE FREE) 0.1-0.3 % Solution Apply in the left eye every 1-2 drops while awake 1 Each 0   • artificial tears (LACRI-LUBE) Ointment ophthalmic ointment Place 1 Inch in left eye every bedtime. For 3 weeks 1 Tube 1   • fenofibrate (TRIGLIDE) 160 MG tablet      • fenofibrate (TRIGLIDE) 160 MG tablet TAKE 1 TABLET BY MOUTH EVERY DAY  1     No current facility-administered medications for this visit.      Allergies: Patient has no known allergies.  He  has a past medical history of Depression; Hyperlipidemia; Hypertension; and Type II or unspecified type diabetes mellitus without mention of complication, not stated as uncontrolled.  He  has a past surgical history that includes tonsillectomy.  Social History   Substance Use Topics   • Smoking status: Former Smoker     Types: Cigarettes     Quit date: 1/1/2006   • Smokeless tobacco: Never Used   • Alcohol use No     Social History     Social History Narrative   • No narrative on file     Family History   Problem Relation Age of Onset   • No Known Problems Mother    • Cancer Father         lymphoma   •  "Diabetes Sister    • No Known Problems Brother      Family Status   Relation Status   • Mo Alive   • Fa Alive   • Sis (Not Specified)   • Bro (Not Specified)     ROS   Constitutional: Negative for fever, chills and weight loss.   HEENT: Negative for blurred vision, sore throat, swollen glands. No hearing loss or vertigo.  Respiratory: Negative for cough, wheezing, shortness of breath.   CVS: Negative for chest pain, palpitations, irregular heart beats, exertional dyspnea.   GI: Negative for heartburn, abdominal pain, nausea, vomiting, change in BMs.   : Negative for dysuria, polyuria.   MS: Negative for myalgias, joint pain.   Neuro: no headaches, numbness, weakness, seizures.   Skin: no skin lesions, rash.  Endocrine: per HPI.     PHYSICAL EXAM   Blood pressure 118/78, pulse 74, temperature 36.2 °C (97.1 °F), temperature source Temporal, height 1.778 m (5' 10\"), weight 88.8 kg (195 lb 12.3 oz), SpO2 98 %. Body mass index is 28.09 kg/m².  Alert, oriented in no acute distress.  Eye contact is good, speech goal directed, affect bright.  HEENT: EOMI, PERRL, conjunctiva non-injected, sclera non-icteric, right strabismus.  Nares patent with no significant congestion or drainage.  Normal pinnae, external auditory canals, TM pearly gray with normal light reflex bilaterally.  Oral mucous membranes pink and moist with no lesions.  Neck supple with no cervical lymphadenopathy, JVD, palpable thyroid nodules or carotid bruits.  Lungs: clear to auscultation bilaterally with good excursion.  CV: regular rate and rhythm, without murmur, rubs, thrills, or gallops.  Abdomen: soft, non-distended, non-tender with normal bowel sounds. No CVAT, No masses, or organomegaly.  Lower extremities: color normal, vascularity normal, no edema, temperature normal  Musculoskeletal: Normal gait. No obvious muscle weakness or wasting.  Psych: Normal mood and affect. Alert and oriented x3. Judgment and insight is normal.  Neuro:speech normal, " mental status intact, cranial nerves 2-12 intact, gait intact.    LABS     Results reviewed and discussed with the patient, questions answered.    Last labs:  Lab Results   Component Value Date/Time    CHOLSTRLTOT 463 (H) 02/01/2019 09:11 AM    LDL Comment 02/01/2019 09:11 AM    HDL 14 (L) 02/01/2019 09:11 AM    TRIGLYCERIDE 3,783 (HH) 02/01/2019 09:11 AM       Lab Results   Component Value Date/Time    SODIUM 129 (L) 02/01/2019 09:11 AM    SODIUM 136 06/04/2017 12:14 PM    POTASSIUM 4.3 02/01/2019 09:11 AM    POTASSIUM 4.9 06/04/2017 12:14 PM    CHLORIDE 95 (L) 02/01/2019 09:11 AM    CHLORIDE 103 06/04/2017 12:14 PM    CO2 18 (L) 02/01/2019 09:11 AM    CO2 11 (L) 06/04/2017 12:14 PM    GLUCOSE 259 (H) 02/01/2019 09:11 AM    GLUCOSE 364 (H) 06/04/2017 12:14 PM    BUN 12 02/01/2019 09:11 AM    BUN 18 06/04/2017 12:14 PM    CREATININE 0.80 02/01/2019 09:11 AM    CREATININE 1.10 06/04/2017 12:14 PM    BUNCREATRAT 15 02/01/2019 09:11 AM     Lab Results   Component Value Date/Time    ALKPHOSPHAT 47 02/01/2019 09:11 AM    ALKPHOSPHAT 112 (H) 06/04/2017 12:14 PM    ASTSGOT 12 02/01/2019 09:11 AM    ASTSGOT 13 06/04/2017 12:14 PM    ALTSGPT 15 02/01/2019 09:11 AM    ALTSGPT 28 06/04/2017 12:14 PM    TBILIRUBIN 0.3 02/01/2019 09:11 AM    TBILIRUBIN 0.6 06/04/2017 12:14 PM      Lab Results   Component Value Date/Time    HBA1C 9.0 (H) 02/01/2019 09:11 AM    HBA1C 7.6 (H) 10/26/2018 09:59 AM    HBA1C 7.0 (H) 12/27/2017 10:29 AM     No results found for: TSH  No results found for: FREET4    Lab Results   Component Value Date/Time    WBC 14.9 (H) 06/04/2017 12:14 PM    RBC 5.88 06/04/2017 12:14 PM    HEMOGLOBIN 18.3 (H) 06/04/2017 12:14 PM    HEMATOCRIT 53.8 (H) 06/04/2017 12:14 PM    MCV 91.5 06/04/2017 12:14 PM    MCH 31.1 06/04/2017 12:14 PM    MCHC 34.0 06/04/2017 12:14 PM    MPV 9.3 06/04/2017 12:14 PM    NEUTSPOLYS 82.80 (H) 06/04/2017 12:14 PM    LYMPHOCYTES 10.70 (L) 06/04/2017 12:14 PM    MONOCYTES 4.00 06/04/2017 12:14  PM    EOSINOPHILS 0.10 06/04/2017 12:14 PM    BASOPHILS 1.00 06/04/2017 12:14 PM      ASSESMENT AND PLAN     1. Uncontrolled type 2 diabetes mellitus without complication, without long-term current use of insulin (HCC)  Uncontrolled, he will start monitoring fingersticks daily and change medications:    - Empagliflozin (JARDIANCE) 25 MG Tab; Take 25 mg by mouth every day.  Dispense: 90 Tab; Refill: 0  - Dulaglutide 0.75 MG/0.5ML Solution Pen-injector; Inject 0.5 mL as instructed every 7 days.  Dispense: 2 mL; Refill: 0  - Blood Glucose Test Strips; TeTest strips for PATIENT meter. Sig: use DAILY and prn ssx high or low sugar #100 RF x 3  Dispense: 1 Package; Refill: 3  - REFERRAL TO OPHTHALMOLOGY  - metformin (GLUCOPHAGE) 1000 MG tablet; Take 1 Tab by mouth 2 times a day, with meals.  Dispense: 180 Tab; Refill: 0    Discussed about:    - importance of appropriate diet and exercise.   - hypoglycemic symptoms and precautions.    - long-term complications of uncontrolled DM, (increased risk of CAD, strokes, retinopathy, nephropathy, /can lead to kidney failure, dialysis/, peripheral neuropathy, (can lead to amputation)   - Good DM control can prevent / delay many of these complications.    - Recommended appropriate foot care     2. Essential hypertension  Controlled, continue current treatment  - lisinopril (PRINIVIL) 10 MG Tab; Take 1 Tab by mouth every day.  Dispense: 90 Tab; Refill: 0    3. Dyslipidemia  Uncontrolled regardless of medications,, especially extremely high triglyceride level.  Advised low fat/low carb diet, daily exercise and change medication from simvastatin to atorvastatin highest dose.    - REFERRAL TO VASCULAR MEDICINE Reason for Referral? Lipids  - atorvastatin (LIPITOR) 80 MG tablet; Take 1 Tab by mouth every evening.  Dispense: 90 Tab; Refill: 0  - fenofibrate (TRIGLIDE) 160 MG tablet; Take 1 Tab by mouth every day.  Dispense: 90 Tab; Refill: 3    4. Strabismus  He will continue ophthalmology  f/u    5. Health care maintenance  Advised immunizations    All questions are answered.    Health Maintenance: Completed    All questions are answered.    Smoking Counseling: Nonsmoker    Followup: Return in about 4 weeks (around 3/6/2019) for DM-RN.

## 2019-02-06 NOTE — TELEPHONE ENCOUNTER
Dr. Kay,     Sample pharmacy does not have Dulaglutide 0.75 MG/0.5ML Solution Pen-injector         Patient wants to know what he should do.     Please advise.     Thank you,     Maryana Simpson  Medical Assistant

## 2019-03-02 ENCOUNTER — PATIENT MESSAGE (OUTPATIENT)
Dept: MEDICAL GROUP | Facility: MEDICAL CENTER | Age: 44
End: 2019-03-02

## 2019-03-05 NOTE — TELEPHONE ENCOUNTER
From: Chino Diaz  To: Susanna Howard M.D.  Sent: 3/2/2019 6:17 AM PST  Subject: Prescription Question    [facility] isn't letting me refill the Jardiance. Says too soon. I've only got 5 days left. I think the issue is that you originally sent over a 90 day prescription which was going to cost over $1000. Didn't have that so they gave me 30 days. Can you send over another 30 day perception, because that will last through my next appointment.

## 2019-03-06 RX ORDER — DULAGLUTIDE 0.75 MG/.5ML
0.5 INJECTION, SOLUTION SUBCUTANEOUS
Qty: 2 PEN | Refills: 1 | Status: SHIPPED | OUTPATIENT
Start: 2019-03-06 | End: 2019-06-04

## 2019-03-06 NOTE — TELEPHONE ENCOUNTER
Was the patient seen in the last year in this department? Yes    Does patient have an active prescription for medications requested? No     Received Request Via: Pharmacy     Pharmacy requested 2 pens

## 2019-03-11 NOTE — PROGRESS NOTES
RN-BHAVINE Note    Subjective:     Health changes since last visit/interval Hx: Chino is a new patient to me.  He has Type II DM, recently started Trulicity and Jardiance in addition to metformin.  He has taken 5 doses of Trulicity, tolerating well.  He has lost 11 pounds in the past month    Medications (including changes made today)  Current Outpatient Prescriptions   Medication Sig Dispense Refill   • TRULICITY 0.75 MG/0.5ML Solution Pen-injector INJECT 0.5 ML AS INSTRUCTED EVERY 7 DAYS. 2 PEN 1   • Empagliflozin (JARDIANCE) 25 MG Tab Take 25 mg by mouth every day. 90 Tab 0   • atorvastatin (LIPITOR) 80 MG tablet Take 1 Tab by mouth every evening. 90 Tab 0   • Blood Glucose Test Strips TeTest strips for PATIENT meter. Sig: use DAILY and prn ssx high or low sugar #100 RF x 3 1 Package 3   • lisinopril (PRINIVIL) 10 MG Tab Take 1 Tab by mouth every day. 90 Tab 0   • metformin (GLUCOPHAGE) 1000 MG tablet Take 1 Tab by mouth 2 times a day, with meals. 180 Tab 0   • fenofibrate (TRIGLIDE) 160 MG tablet Take 1 Tab by mouth every day. 90 Tab 3   • aspirin EC (ECOTRIN) 81 MG Tablet Delayed Response Take 1 Tab by mouth every day. 100 Tab 11     No current facility-administered medications for this visit.        Taking daily ASA: Yes  Taking above medications as prescribed: yes  SIDE EFFECTS: Patient denies side effects to medications    Exercise: no regular exercise, sedentary  Diet: meals per day on average: 2  Patient's body mass index is unknown because there is no height or weight on file. Exercise and nutrition counseling were performed at this visit.      Health Maintenance:   Health Maintenance Due   Topic Date Due   • IMM HEP B VACCINE (1 of 3 - Risk 3-dose series) 12/17/1992   • IMM PNEUMOCOCCAL 19-64 (ADULT) MEDIUM RISK SERIES (1 of 1 - PPSV23) 12/17/1992   • RETINAL SCREENING  12/27/2018       Immunizations:   PPSV23: Due  [id number]: not asked  Tdap: Up-to-date  Flu: Up-to-date  Hep B: Due    DM:   Last A1c:   Lab  Results   Component Value Date/Time    HBA1C 9.0 (H) 02/01/2019 09:11 AM      A1C GOAL: < 7    Glucose monitoring frequency: 2x/day  past week all FSBG   Hypoglycemic episodes: no    Last Retinal Exam: appt 3/20/19  Daily Foot Exam: No advised  Routine Dental Exams: No- dentures    Lab Results   Component Value Date/Time    MICRALB 45.5 02/01/2019 09:11 AM        ACR Albumin/Creatinine Ratio goal <30     HTN:   Blood pressure goal <140/<80 yes.   Currently Rx ACE/ARB: Yes    Dyslipidemia:    Lab Results   Component Value Date/Time    CHOLSTRLTOT 463 (H) 02/01/2019 09:11 AM    LDL Comment 02/01/2019 09:11 AM    HDL 14 (L) 02/01/2019 09:11 AM    TRIGLYCERIDE 3,783 (HH) 02/01/2019 09:11 AM       Lab Results   Component Value Date/Time    SODIUM 129 (L) 02/01/2019 09:11 AM    SODIUM 136 06/04/2017 12:14 PM    POTASSIUM 4.3 02/01/2019 09:11 AM    POTASSIUM 4.9 06/04/2017 12:14 PM    CHLORIDE 95 (L) 02/01/2019 09:11 AM    CHLORIDE 103 06/04/2017 12:14 PM    CO2 18 (L) 02/01/2019 09:11 AM    CO2 11 (L) 06/04/2017 12:14 PM    GLUCOSE 259 (H) 02/01/2019 09:11 AM    GLUCOSE 364 (H) 06/04/2017 12:14 PM    BUN 12 02/01/2019 09:11 AM    BUN 18 06/04/2017 12:14 PM    CREATININE 0.80 02/01/2019 09:11 AM    CREATININE 1.10 06/04/2017 12:14 PM    BUNCREATRAT 15 02/01/2019 09:11 AM     Lab Results   Component Value Date/Time    ALKPHOSPHAT 47 02/01/2019 09:11 AM    ALKPHOSPHAT 112 (H) 06/04/2017 12:14 PM    ASTSGOT 12 02/01/2019 09:11 AM    ASTSGOT 13 06/04/2017 12:14 PM    ALTSGPT 15 02/01/2019 09:11 AM    ALTSGPT 28 06/04/2017 12:14 PM    TBILIRUBIN 0.3 02/01/2019 09:11 AM    TBILIRUBIN 0.6 06/04/2017 12:14 PM        Currently Rx Statin: Yes    He  reports that he quit smoking about 13 years ago. His smoking use included Cigarettes. He has never used smokeless tobacco.    Objective:     Exam:  Monofilament: not done    Plan:     Discussed and educated on:   - All medications, side effects and compliance (discussed  carefully)  - Annual eye examinations at Ophthalmology  - Diabetic Meal Plan: foods that contain carbs and plate method  - Foot Care: what to look for when checking feet every day and when to contact HCP  - HbA1C: target and what A1C is  - Home glucose monitoring emphasized  - Testing Blood Glucose: when to test, recording blood sugars, target range for FSBS and when to contact HCP  - Weight control and daily exercise    Recommended medication changes: none, no carbohydrates after dinner, add protein snack at bedtime and drink plenty of water.  may consider discontinuing metformin next visit

## 2019-03-12 ENCOUNTER — OFFICE VISIT (OUTPATIENT)
Dept: MEDICAL GROUP | Facility: MEDICAL CENTER | Age: 44
End: 2019-03-12
Payer: COMMERCIAL

## 2019-03-12 VITALS
BODY MASS INDEX: 26.39 KG/M2 | DIASTOLIC BLOOD PRESSURE: 80 MMHG | SYSTOLIC BLOOD PRESSURE: 132 MMHG | WEIGHT: 184.3 LBS | HEIGHT: 70 IN | OXYGEN SATURATION: 98 % | HEART RATE: 72 BPM

## 2019-03-12 DIAGNOSIS — Z00.00 HEALTH CARE MAINTENANCE: ICD-10-CM

## 2019-03-12 DIAGNOSIS — I10 ESSENTIAL HYPERTENSION: ICD-10-CM

## 2019-03-12 DIAGNOSIS — E78.5 DYSLIPIDEMIA: ICD-10-CM

## 2019-03-12 DIAGNOSIS — E87.1 HYPONATREMIA: ICD-10-CM

## 2019-03-12 PROCEDURE — 99214 OFFICE O/P EST MOD 30 MIN: CPT | Performed by: INTERNAL MEDICINE

## 2019-03-12 NOTE — PROGRESS NOTES
CHIEF COMPLAINT  Chief Complaint   Patient presents with   • Diabetes   With DM RN    HPI  Patient is a 43 y.o. male patient who presents today for the following     DIABETES MELLITUS TYPE 2  Onset/D  Diabetes education:  Y     Medications:   • Metformin:  1000 mg BID  • Jardiance 25 mg QD  • Trulicity 0.75 mg weekly  • ACE/ARB: lisinopril  • Statin: simvastatin 20 mg QD  • ASA: Y  Compliant with medications: yes  Checking feet daily/wear soft socks/shoes: advised     Diabetes ABCDE TARGETS  • A1c, last:  9.0  • Fingersticks:  yes  o   • Hypoglycemia:  No  - No symptoms of hypoglycemia: tremors, hunger, sometimes dizzy  • Blood Pressure, goal < 130/80: yes  • Cholesterol-Lipid Panel: uncontrolled, referred to vascular medicine  • Dysalbuminuria:  neg     Diet: decreased carbs  Exercise:  N  BMI: 26 - lost 11 lbs     DM complications:  • Peripheral neuropathy:   No numbness or tingling sensation in the feet.  • Retinopathy:                           Last eye exam: 3/2019.   • Nephropathy:                No CAD.  GI:                                   No gastropathy.  FH of DM: sister     Hypertension  Meds: lisinopril, 10 mg daily; taking as prescribed.   He is not measuring BP at home.  Denies:  -  headaches, vision problems, tinnitus.                 -  chest pain/pressure, palpitations, irregular heart beats, exertional, dyspnea, peripheral edema.  Low salt diet: N  Diet / exercise / BMI: as above.   FH of HTN: father     Hyperlipidemia  Uncontrolled, very high, referred to vascular medicine.  Statin: Atorvastatin 80 mg daily, fenofibrate 160 mg daily daily, taking as prescribed. No muscle weakness, cramps, nausea,abdominal discomfort.   Diet / exercise / BMI: as above.   FH: mother     Reviewed PMH, PSH, FH, SH, ALL, HCM/IMM, no changes  Reviewed MEDS, no changes    Patient Active Problem List    Diagnosis Date Noted   • Strabismus 2019   • IGTN (ingrowing toe nail) 2018   • Uncontrolled  type 2 diabetes mellitus without complication, without long-term current use of insulin (HCC) 12/26/2017   • Essential hypertension 12/26/2017   • Dyslipidemia 12/26/2017   • Health care maintenance 12/26/2017     CURRENT MEDICATIONS  Current Outpatient Prescriptions   Medication Sig Dispense Refill   • TRULICITY 0.75 MG/0.5ML Solution Pen-injector INJECT 0.5 ML AS INSTRUCTED EVERY 7 DAYS. 2 PEN 1   • Empagliflozin (JARDIANCE) 25 MG Tab Take 25 mg by mouth every day. 90 Tab 0   • atorvastatin (LIPITOR) 80 MG tablet Take 1 Tab by mouth every evening. 90 Tab 0   • Blood Glucose Test Strips TeTest strips for PATIENT meter. Sig: use DAILY and prn ssx high or low sugar #100 RF x 3 1 Package 3   • lisinopril (PRINIVIL) 10 MG Tab Take 1 Tab by mouth every day. 90 Tab 0   • metformin (GLUCOPHAGE) 1000 MG tablet Take 1 Tab by mouth 2 times a day, with meals. 180 Tab 0   • fenofibrate (TRIGLIDE) 160 MG tablet Take 1 Tab by mouth every day. 90 Tab 3   • aspirin EC (ECOTRIN) 81 MG Tablet Delayed Response Take 1 Tab by mouth every day. 100 Tab 11     No current facility-administered medications for this visit.      ALLERGIES  Allergies: Patient has no known allergies.  PAST MEDICAL HISTORY  Past Medical History:   Diagnosis Date   • Depression    • Hyperlipidemia    • Hypertension    • Type II or unspecified type diabetes mellitus without mention of complication, not stated as uncontrolled      SURGICAL HISTORY  He  has a past surgical history that includes tonsillectomy.  SOCIAL HISTORY  Social History   Substance Use Topics   • Smoking status: Former Smoker     Types: Cigarettes     Quit date: 1/1/2006   • Smokeless tobacco: Never Used   • Alcohol use No     Social History     Social History Narrative   • No narrative on file     FAMILY HISTORY  Family History   Problem Relation Age of Onset   • No Known Problems Mother    • Cancer Father         lymphoma   • Diabetes Sister    • No Known Problems Brother      Family Status  "  Relation Status   • Mo Alive   • Fa Alive   • Sis (Not Specified)   • Bro (Not Specified)     ROS   Constitutional: Negative for fatigue.  HENT: Negative for congestion.  Eyes: Negative for blurred vision.   Respiratory: Negative for cough, shortness of breath.  Cardiovascular: Negative for chest pain, palpitations. And per HPI.  Gastrointestinal: Negative for heartburn, nausea, abdominal pain.   Genitourinary: Negative for dysuria.  Musculoskeletal: Negative for significant myalgias, back pain and joint pain.   Skin: Negative for rash and itching.   Neuro: Negative for dizziness, weakness and headaches.   Endo/Heme/Allergies: as above.  Psychiatric/Behavioral: Negative for depression.    PHYSICAL EXAM   /80   Pulse 72   Ht 1.778 m (5' 10\")   Wt 83.6 kg (184 lb 4.9 oz)   SpO2 98%   BMI 26.44 kg/m²   General:  NAD, well appearing  HEENT:   NC/AT, PERRLA, EOMI, TMs are clear. Oropharyngeal mucosa is pink,  without lesions;  no cervical / supraclavicular  lymphadenopathy, no thyromegaly.    Cardiovascular: RRR.   No m/r/g. No carotid bruits .      Lungs:   CTAB, no w/r/r, no respiratory distress.  Abdomen: Soft, NT/ND + BS; no  hepatosplenomegaly.  Extremities:  2+ DP and radial pulses bilaterally.  No c/c/e.   Skin:  Warm, dry.  No erythema. No rash.   Neurologic: Alert & oriented x 3. CN II-XII grossly intact.  No focal deficits.  Psychiatric:  Affect normal, mood normal, judgment normal.    LABS     Labs are reviewed and discussed with a patient  Lab Results   Component Value Date/Time    CHOLSTRLTOT 463 (H) 02/01/2019 09:11 AM    LDL Comment 02/01/2019 09:11 AM    HDL 14 (L) 02/01/2019 09:11 AM    TRIGLYCERIDE 3,783 (HH) 02/01/2019 09:11 AM       Lab Results   Component Value Date/Time    SODIUM 129 (L) 02/01/2019 09:11 AM    SODIUM 136 06/04/2017 12:14 PM    POTASSIUM 4.3 02/01/2019 09:11 AM    POTASSIUM 4.9 06/04/2017 12:14 PM    CHLORIDE 95 (L) 02/01/2019 09:11 AM    CHLORIDE 103 06/04/2017 12:14 PM "    CO2 18 (L) 02/01/2019 09:11 AM    CO2 11 (L) 06/04/2017 12:14 PM    GLUCOSE 259 (H) 02/01/2019 09:11 AM    GLUCOSE 364 (H) 06/04/2017 12:14 PM    BUN 12 02/01/2019 09:11 AM    BUN 18 06/04/2017 12:14 PM    CREATININE 0.80 02/01/2019 09:11 AM    CREATININE 1.10 06/04/2017 12:14 PM    BUNCREATRAT 15 02/01/2019 09:11 AM     Lab Results   Component Value Date/Time    ALKPHOSPHAT 47 02/01/2019 09:11 AM    ALKPHOSPHAT 112 (H) 06/04/2017 12:14 PM    ASTSGOT 12 02/01/2019 09:11 AM    ASTSGOT 13 06/04/2017 12:14 PM    ALTSGPT 15 02/01/2019 09:11 AM    ALTSGPT 28 06/04/2017 12:14 PM    TBILIRUBIN 0.3 02/01/2019 09:11 AM    TBILIRUBIN 0.6 06/04/2017 12:14 PM      Lab Results   Component Value Date/Time    HBA1C 9.0 (H) 02/01/2019 09:11 AM    HBA1C 7.6 (H) 10/26/2018 09:59 AM    HBA1C 7.0 (H) 12/27/2017 10:29 AM     Lab Results   Component Value Date/Time    WBC 14.9 (H) 06/04/2017 12:14 PM    RBC 5.88 06/04/2017 12:14 PM    HEMOGLOBIN 18.3 (H) 06/04/2017 12:14 PM    HEMATOCRIT 53.8 (H) 06/04/2017 12:14 PM    MCV 91.5 06/04/2017 12:14 PM    MCH 31.1 06/04/2017 12:14 PM    MCHC 34.0 06/04/2017 12:14 PM    MPV 9.3 06/04/2017 12:14 PM    NEUTSPOLYS 82.80 (H) 06/04/2017 12:14 PM    LYMPHOCYTES 10.70 (L) 06/04/2017 12:14 PM    MONOCYTES 4.00 06/04/2017 12:14 PM    EOSINOPHILS 0.10 06/04/2017 12:14 PM    BASOPHILS 1.00 06/04/2017 12:14 PM      IMAGING     None    ASSESMENT AND PLAN        1. Uncontrolled type 2 diabetes mellitus without complication, without long-term current use of insulin (HCC)  Significantly improved by fingersticks, continue current treatment;   Continue lifestyle modification  -Follow-up in 2 months with labs  - POCT Retinal Eye Exam - has scheduled ophthalmology appointment  - Comp Metabolic Panel; Future  - HEMOGLOBIN A1C; Future  - MICROALBUMIN CREAT RATIO URINE; Future  - Lipid Profile; Future    2. Essential hypertension  Controlled, continue current treatment    3. Hyponatremia  Advised to increase salt  intake    4. Dyslipidemia  Uncontrolled, she has schedule appointment with vascular medicine in 2 weeks  - Lipid Profile; Future    5. Health care maintenance   Discussed immunizations x2    Counseling:   - Smoking:  Nonsmoker    Followup: in 2 months with labs and DM RN    All questions are answered.    Please note that this dictation was created using voice recognition software, and that there might be errors of titus and possibly content.

## 2019-03-26 RX ORDER — DULAGLUTIDE 0.75 MG/.5ML
0.5 INJECTION, SOLUTION SUBCUTANEOUS
Qty: 6 PEN | Refills: 1 | Status: SHIPPED | OUTPATIENT
Start: 2019-03-26 | End: 2019-04-18

## 2019-04-18 ENCOUNTER — OFFICE VISIT (OUTPATIENT)
Dept: VASCULAR LAB | Facility: MEDICAL CENTER | Age: 44
End: 2019-04-18
Attending: INTERNAL MEDICINE
Payer: COMMERCIAL

## 2019-04-18 ENCOUNTER — TELEPHONE (OUTPATIENT)
Dept: VASCULAR LAB | Facility: MEDICAL CENTER | Age: 44
End: 2019-04-18

## 2019-04-18 VITALS
HEIGHT: 70 IN | BODY MASS INDEX: 27.06 KG/M2 | SYSTOLIC BLOOD PRESSURE: 121 MMHG | WEIGHT: 189 LBS | HEART RATE: 72 BPM | DIASTOLIC BLOOD PRESSURE: 62 MMHG

## 2019-04-18 DIAGNOSIS — I10 ESSENTIAL HYPERTENSION: ICD-10-CM

## 2019-04-18 DIAGNOSIS — E78.5 DYSLIPIDEMIA: ICD-10-CM

## 2019-04-18 PROCEDURE — 99212 OFFICE O/P EST SF 10 MIN: CPT

## 2019-04-18 PROCEDURE — 99204 OFFICE O/P NEW MOD 45 MIN: CPT | Performed by: INTERNAL MEDICINE

## 2019-04-18 RX ORDER — OMEGA-3-ACID ETHYL ESTERS 1 G/1
2 CAPSULE, LIQUID FILLED ORAL 2 TIMES DAILY
Qty: 120 CAP | Refills: 11 | Status: SHIPPED | OUTPATIENT
Start: 2019-04-18 | End: 2020-02-10

## 2019-04-18 NOTE — TELEPHONE ENCOUNTER
PA for Omega-3-acid Ethyl Esters has been approved via cover my meds.    PA # [id number]  Coverage end date is 4/17/2022

## 2019-04-18 NOTE — PROGRESS NOTES
INITIAL VASCULAR VISIT  Subjective:   Chino Diaz is a 43 y.o. male who presents today 4/18/2019 for   Chief Complaint   Patient presents with   • New Patient     HPI:  Referred for evaluation if hypertriglyceridemia in setting of dm and hypertension  No ASCVD. No heart issues  No pancreatitis  Diabetes for 12 years - in the past it was on good control.  Back on his program and it is improving.   Will have a1c again on may  -150, mostly fasting  No other known dm complications  BP in office seem reasonble - on lisinopril  No home readings  Trigs up at same time as dm  Has been over 3500   Changed to atorvastatin about a months ago - changed from simva fairly recently.  No problems with fenofibrate - been on it for a long time  Has taken OCT fish oil in past - no issues  Never been on prescription strength fish oil  On aspirin daily  No cardiovascular issues      Past Medical History:   Diagnosis Date   • Depression    • Hyperlipidemia    • Hypertension    • Type II or unspecified type diabetes mellitus without mention of complication, not stated as uncontrolled      Past Surgical History:   Procedure Laterality Date   • TONSILLECTOMY       Family History   Problem Relation Age of Onset   • Hyperlipidemia Mother         high trigs   • Cancer Father         lymphoma   • Diabetes Sister    • No Known Problems Brother    • Hyperlipidemia Maternal Grandfather         high trigs   • Heart Attack Maternal Uncle 27        mi     History   Smoking Status   • Former Smoker   • Types: Cigarettes   • Quit date: 1/1/2006   Smokeless Tobacco   • Never Used     Social History   Substance Use Topics   • Smoking status: Former Smoker     Types: Cigarettes     Quit date: 1/1/2006   • Smokeless tobacco: Never Used   • Alcohol use No     Outpatient Encounter Prescriptions as of 4/18/2019   Medication Sig Dispense Refill   • vitamin D (CHOLECALCIFEROL) 1000 UNIT Tab Take 1,000 Units by mouth every day.     • omega-3  "acid ethyl esters (LOVAZA) 1 GM capsule Take 2 Caps by mouth 2 Times a Day. 120 Cap 11   • TRULICITY 0.75 MG/0.5ML Solution Pen-injector INJECT 0.5 ML AS INSTRUCTED EVERY 7 DAYS. 2 PEN 1   • Empagliflozin (JARDIANCE) 25 MG Tab Take 25 mg by mouth every day. 90 Tab 0   • atorvastatin (LIPITOR) 80 MG tablet Take 1 Tab by mouth every evening. 90 Tab 0   • Blood Glucose Test Strips TeTest strips for PATIENT meter. Sig: use DAILY and prn ssx high or low sugar #100 RF x 3 1 Package 3   • lisinopril (PRINIVIL) 10 MG Tab Take 1 Tab by mouth every day. 90 Tab 0   • metformin (GLUCOPHAGE) 1000 MG tablet Take 1 Tab by mouth 2 times a day, with meals. 180 Tab 0   • fenofibrate (TRIGLIDE) 160 MG tablet Take 1 Tab by mouth every day. 90 Tab 3   • aspirin EC (ECOTRIN) 81 MG Tablet Delayed Response Take 1 Tab by mouth every day. 100 Tab 11   • [DISCONTINUED] TRULICITY 0.75 MG/0.5ML Solution Pen-injector INJECT 0.5 ML AS INSTRUCTED EVERY 7 DAYS. 6 PEN 1     No facility-administered encounter medications on file as of 4/18/2019.      No Known Allergies     DIET AND EXERCISE:  Weight Change: up and down  Diet: decent dm diet  Exercise: minimal exercise     ROS - as per my intake form which I reviewed with him and signed.  All other systems negative     Objective:     Vitals:    04/18/19 0949   BP: 121/62   BP Location: Left arm   Patient Position: Sitting   BP Cuff Size: Adult   Pulse: 72   Weight: 85.7 kg (189 lb)   Height: 1.778 m (5' 10\")      Body mass index is 27.12 kg/m².  Physical Exam   Constitutional: He is oriented to person, place, and time. He appears well-developed and well-nourished. No distress.   HENT:   Head: Normocephalic and atraumatic.   Eyes: Pupils are equal, round, and reactive to light. Conjunctivae and EOM are normal. No scleral icterus.   Neck: Normal range of motion. Neck supple. No JVD present. No thyromegaly present.   Cardiovascular: Normal rate, regular rhythm, normal heart sounds and intact distal " pulses.  Exam reveals no gallop and no friction rub.    No murmur heard.  Pulmonary/Chest: Breath sounds normal. No respiratory distress. He has no wheezes. He has no rales.   Abdominal: Soft. Bowel sounds are normal. He exhibits no distension and no mass. There is no tenderness. There is no rebound.   Musculoskeletal: Normal range of motion. He exhibits no edema or tenderness.   Neurological: He is alert and oriented to person, place, and time. No cranial nerve deficit. Coordination normal.   Skin: Skin is warm and dry. No rash noted. He is not diaphoretic.   Psychiatric: He has a normal mood and affect. His behavior is normal.   Vitals reviewed.    Lab Results   Component Value Date    CHOLSTRLTOT 463 (H) 02/01/2019    LDL Comment 02/01/2019    HDL 14 (L) 02/01/2019    TRIGLYCERIDE 3,783 (HH) 02/01/2019         Lab Results   Component Value Date    HBA1C 9.0 (H) 02/01/2019      Lab Results   Component Value Date    SODIUM 129 (L) 02/01/2019    SODIUM 136 06/04/2017    POTASSIUM 4.3 02/01/2019    POTASSIUM 4.9 06/04/2017    CHLORIDE 95 (L) 02/01/2019    CHLORIDE 103 06/04/2017    CO2 18 (L) 02/01/2019    CO2 11 (L) 06/04/2017    GLUCOSE 259 (H) 02/01/2019    GLUCOSE 364 (H) 06/04/2017    BUN 12 02/01/2019    BUN 18 06/04/2017    CREATININE 0.80 02/01/2019    CREATININE 1.10 06/04/2017    BUNCREATRAT 15 02/01/2019    IFAFRICA 125 02/01/2019    IFAFRICA >60 06/04/2017    IFNOTAFR 108 02/01/2019    IFNOTAFR >60 06/04/2017        Lab Results   Component Value Date    WBC 14.9 (H) 06/04/2017    RBC 5.88 06/04/2017    HEMOGLOBIN 18.3 (H) 06/04/2017    HEMATOCRIT 53.8 (H) 06/04/2017    MCV 91.5 06/04/2017    MCH 31.1 06/04/2017    MCHC 34.0 06/04/2017    MPV 9.3 06/04/2017      Medical Decision Making:  Today's Assessment / Status / Plan:     1. Dyslipidemia  LDL, DIRECT    APOLIPOPROTEIN B    CRP HIGH SENSITIVE    LipoFit by NMR    REFERRAL TO [facility] IMPROVEMENT PROGRAMS (HIP) Services Requested: Registered  Dietitian for Medical Nutrition Therapy; Reason for Visit: Medical Condition Requiring Nutrition Counseling; Other: diabetes and hypertriglyceridemia   2. Essential hypertension     3. Uncontrolled type 2 diabetes mellitus without complication, without long-term current use of insulin (HCC)  REFERRAL TO [facility] IMPROVEMENT PROGRAMS (HIP) Services Requested: Registered Dietitian for Medical Nutrition Therapy; Reason for Visit: Medical Condition Requiring Nutrition Counseling; Other: diabetes and hypertriglyceridemia     Patient Type: Primary Prevention    Etiology of Established CVD if Present: None but does have 5/5 components of metabolic syndrome at baseline    Lipid Management: Qualifies for Statin Therapy Based on 2013 ACC/AHA Guidelines: yes  Calculated 10-Year Risk of ASCVD: N/A  Currently on Statin: Yes  Patient with marked hypertriglyceridemia putting him at risk for pancreatitis -baseline triglycerides greater than 3000  No previous history of ASCVD or pancreatitis  Poor glycemic control likely leading to down-regulation of lipoprotein lipase  Has concomitant low HDL and likely small dense LDL particle  Plan:  -Aggressive lifestyle modification as per below  -More aggressive glycemic control  -Continue fenofibrate  -Continue high intensity statin  -Add lovaza  -Repeat fasting lipid panel with triglycerides direct LDL, apoB, and nmr lipoprofile prior to next visit    Blood Pressure Management:  ACC-aHA goal less than 130/80  Appears under reasonable control both in the office and at home  -Continue lisinopril  -Encouraged home blood pressure monitoring    Glycemic Status:   Goal A1c less than 7  Well above goal prior to intensification of pharmacologic and nonpharmacologic therapy  Fingersticks are now much improved  -Continue Trulicity, metformin, and Jardiance  -Recheck A1c  -Intensify therapy as needed  -Continue yearly flu shot and yearly eye doctor    Anti-Platelet/Anti-Coagulant Tx:  yes  -Continue low-dose aspirin    Smoking: Continue complete avoidance    Physical Activity: Increase exercise to daily    Weight Management and Nutrition: Referral to dietitian for recommendations regarding low simple carbohydrate and low overall fat intake.  Diet should consist mostly of complex carbohydrates and protein.  Over time will need to concentrate on slow steady weight loss    Instructed to follow-up with PCP for remainder of adult medical needs: yes  We will partner with other providers in the management of established vascular disease and cardiometabolic risk factors.    Studies to Be Obtained: None  Labs to Be Obtained: As above prior to next visit    Follow up in: 2 months    Michael J Bloch, M.D.     Cc: TIM Howard

## 2019-04-27 DIAGNOSIS — E78.5 DYSLIPIDEMIA: ICD-10-CM

## 2019-04-29 RX ORDER — SIMVASTATIN 40 MG
TABLET ORAL
Qty: 90 TAB | Refills: 3 | Status: SHIPPED | OUTPATIENT
Start: 2019-04-29 | End: 2019-06-04

## 2019-05-05 DIAGNOSIS — E78.5 DYSLIPIDEMIA: ICD-10-CM

## 2019-05-06 RX ORDER — ATORVASTATIN CALCIUM 80 MG/1
TABLET, FILM COATED ORAL
Qty: 90 TAB | Refills: 1 | Status: SHIPPED | OUTPATIENT
Start: 2019-05-06 | End: 2019-06-04 | Stop reason: SDUPTHER

## 2019-05-15 ENCOUNTER — NON-PROVIDER VISIT (OUTPATIENT)
Dept: HEALTH INFORMATION MANAGEMENT | Facility: MEDICAL CENTER | Age: 44
End: 2019-05-15
Payer: COMMERCIAL

## 2019-05-15 DIAGNOSIS — E78.5 DYSLIPIDEMIA: ICD-10-CM

## 2019-05-15 PROCEDURE — 97802 MEDICAL NUTRITION INDIV IN: CPT | Performed by: DIETITIAN, REGISTERED

## 2019-05-15 NOTE — PROGRESS NOTES
"5/15/2019    Bloch, Michael J, M.D.  43 y.o.     Time in/out: 7:36-8:21    Anthropometrics/Objective  There were no vitals filed for this visit.  There is no height or weight on file to calculate BMI.      Client history:  PMH: HTN, dyslipidemia, uncontrolled T2DM  Pertinent Medications Include: lipitor, simvastatin, vit D, omega-3 (1G), trulicity, empagliflozin, lisinopril, metformin, fenofibrate, ASA    Biochemical data, medical test and procedures  Lab Results   Component Value Date/Time    HBA1C 9.0 (H) 02/01/2019 09:11 AM     Lab Results   Component Value Date/Time    POCGLUCOSE 305 (A) 09/07/2018 12:48 PM     Lab Results   Component Value Date/Time    CHOLSTRLTOT 463 (H) 02/01/2019 09:11 AM    LDL Comment 02/01/2019 09:11 AM    HDL 14 (L) 02/01/2019 09:11 AM    TRIGLYCERIDE 3,783 (HH) 02/01/2019 09:11 AM       Subjective: Patient is being referred for MNT today for the treatment of uncontrolled T2DM and dyslipidemia.   - Chino has had T2DM x 12 years. Received group DM education when newly diagnosed, but non since then   - He recently saw MAURICE Higuera/CDE   - He states he had good blood sugar control at one time and was vigilant about his diet, but then because he was having good blood sugars would feel like he could eat more \"treats\", then slowly he just became less and less focused on his food choices   - Used to drink a \"moderate amount\", quit for 7 years, then started drinking again, then quit again for another 2 years and then started again and was currently drinking at time of most recent labs but has since quit once more. States his drinking ~2 drinks a few times a week.   - Does his own shopping and cooking   - Does know to read labels for total carb    B - oatmeal  S - apple and KIND nut bar  L - fish or can of soup   S - nuts  D - chicken breast or fish   S - sometimes a square or 2 of dark chocolate  Drinks: V8, tea with 2% milk, diet soda occasionally, diet green tea, water      Nutrition Diagnosis " (PES Statement)  Problem (Nutrition diagnosis)  1. Altered nutrition related lab values    Etiology(Addresses the cause,contributing factors)  1. R/t alterations in lipid regulation, endocrine disorder and dietary contributing factors    Signs/Symptoms (Address observations and stated info: subjective and objective data)  1. As evidenced by elevated A1c, TG and TChol and low HDL    Nutrition Intervention  Nutrition Prescription  Recommended Daily Kcals: 2147 kcal/d based on 25 kcal/kg CBW  Carb choices/grams:   Breakfast: 30-45g   Snack: 15g   Lunch: 30-45g   Snack: 15g   Dinner: 45-60g   Snack, optional: 0-15g  Low-saturated fat  High fiber    Recommend Therapeutic Dietary Intervention  Consistent Complex CHO diet    Comprehensive Nutrition education Instruction or training leading to in-depth nutrition related knowledge about:  Combine carb, protein and fat at each meal, Meal timing and spacing, Metabolism of carb, protein, fat, Physical activity/exercise, Avoid Sweets and alcohol, Label Reading, Handouts provided regarding topics discussed and Theraputic diet for T2DM and hypertriglyceridemia    Monitoring & Evaluation Plan    Behavioral-Environmental:  Physical activity - begin physical activity routine of some kind    Food / Nutrient Intake:  Decrease simple CHO  Increase intake of complex CHO and consistent intake throughout the day  Increase NSV intake  Decrease saturated fat intake    Physical Signs / Symptoms:  HbA1c profiles < 7.0% and Lipid profiles WNL    Assessment Notes: Educated Chino on the dietary contributors to hypertriglyceridemia, including elevated BG. Explained refined CHO vs complex CHO, how to read a label for fiber and saturated fat and total CHO. Recommended no alcohol or juice or sweets at this time due to critically high TG. Discussed omega-3 and foods high in omega-3 and how to incorporate more into his diet. Educated Chino on consistent CHO diet, CHO counting in grams, using labels  and in servings. Given his current intake of CHO, which appears less than 60, I did not recommend the 45-60g at each meal, but a lower 30-45 for breakfast at dinner and then given dinner is his larger meal, from 45-60 at dinner and explained to see what amount of food he feels best at and has good glycemic control with. He does go > 5 hrs between breakfast and lunch and between lunch and dinner, thus I built in a 15g CHO snack between these meals. He does not go longer than 5 hr between dinner and bed, thus left this optional with optional CHO. Answered all questions and provided contact information for any additional questions or concerns.    F/u: PRN

## 2019-05-23 LAB
ALBUMIN SERPL-MCNC: 4.4 G/DL (ref 3.5–5.5)
ALBUMIN/CREAT UR: <5.9 MG/G CREAT (ref 0–30)
ALBUMIN/GLOB SERPL: 1.8 {RATIO} (ref 1.2–2.2)
ALP SERPL-CCNC: 33 IU/L (ref 39–117)
ALT SERPL-CCNC: 32 IU/L (ref 0–44)
AST SERPL-CCNC: 19 IU/L (ref 0–40)
BILIRUB SERPL-MCNC: 0.5 MG/DL (ref 0–1.2)
BUN SERPL-MCNC: 18 MG/DL (ref 6–24)
BUN/CREAT SERPL: 20 (ref 9–20)
CALCIUM SERPL-MCNC: 9.8 MG/DL (ref 8.7–10.2)
CHLORIDE SERPL-SCNC: 104 MMOL/L (ref 96–106)
CHOLEST SERPL-MCNC: 92 MG/DL (ref 100–199)
CO2 SERPL-SCNC: 19 MMOL/L (ref 20–29)
CREAT SERPL-MCNC: 0.9 MG/DL (ref 0.76–1.27)
CREAT UR-MCNC: 51.1 MG/DL
GLOBULIN SER CALC-MCNC: 2.4 G/DL (ref 1.5–4.5)
GLUCOSE SERPL-MCNC: 99 MG/DL (ref 65–99)
HBA1C MFR BLD: 5.6 % (ref 4.8–5.6)
HDLC SERPL-MCNC: 19 MG/DL
LABORATORY COMMENT REPORT: ABNORMAL
LDLC SERPL CALC-MCNC: 0 MG/DL (ref 0–99)
MICROALBUMIN UR-MCNC: <3 UG/ML
POTASSIUM SERPL-SCNC: 4.2 MMOL/L (ref 3.5–5.2)
PROT SERPL-MCNC: 6.8 G/DL (ref 6–8.5)
SODIUM SERPL-SCNC: 138 MMOL/L (ref 134–144)
TRIGL SERPL-MCNC: 364 MG/DL (ref 0–149)
VLDLC SERPL CALC-MCNC: 73 MG/DL (ref 5–40)

## 2019-05-24 LAB
APO B SERPL-MCNC: 65 MG/DL
CHOLEST SERPL-MCNC: 99 MG/DL (ref 100–199)
HDL SERPL-SCNC: 16.8 UMOL/L
HDLC SERPL-MCNC: 16 MG/DL
LABORATORY COMMENT REPORT: NORMAL
LDL SERPL QN: 19.5 NM
LDL SERPL-SCNC: 300 NMOL/L
LDL SMALL SERPL-SCNC: 249 NMOL/L
LDLC SERPL CALC-MCNC: 7 MG/DL (ref 0–99)
LDLC SERPL DIRECT ASSAY-MCNC: 32 MG/DL (ref 0–99)
LP-IR SCORE SERPL: 40
TRIGL SERPL-MCNC: 380 MG/DL (ref 0–149)

## 2019-06-04 ENCOUNTER — OFFICE VISIT (OUTPATIENT)
Dept: MEDICAL GROUP | Facility: MEDICAL CENTER | Age: 44
End: 2019-06-04
Payer: COMMERCIAL

## 2019-06-04 VITALS
TEMPERATURE: 98 F | WEIGHT: 186.51 LBS | HEART RATE: 84 BPM | DIASTOLIC BLOOD PRESSURE: 74 MMHG | OXYGEN SATURATION: 96 % | SYSTOLIC BLOOD PRESSURE: 114 MMHG | HEIGHT: 70 IN | BODY MASS INDEX: 26.7 KG/M2

## 2019-06-04 DIAGNOSIS — E78.5 DYSLIPIDEMIA: ICD-10-CM

## 2019-06-04 DIAGNOSIS — Z00.00 HEALTH CARE MAINTENANCE: ICD-10-CM

## 2019-06-04 DIAGNOSIS — E11.9 CONTROLLED TYPE 2 DIABETES MELLITUS WITHOUT COMPLICATION, WITHOUT LONG-TERM CURRENT USE OF INSULIN (HCC): ICD-10-CM

## 2019-06-04 DIAGNOSIS — I10 ESSENTIAL HYPERTENSION: ICD-10-CM

## 2019-06-04 DIAGNOSIS — Z23 NEED FOR 23-POLYVALENT PNEUMOCOCCAL POLYSACCHARIDE VACCINE: ICD-10-CM

## 2019-06-04 PROBLEM — L60.0 IGTN (INGROWING TOE NAIL): Status: RESOLVED | Noted: 2018-01-02 | Resolved: 2019-06-04

## 2019-06-04 PROCEDURE — 90732 PPSV23 VACC 2 YRS+ SUBQ/IM: CPT | Performed by: INTERNAL MEDICINE

## 2019-06-04 PROCEDURE — 90471 IMMUNIZATION ADMIN: CPT | Performed by: INTERNAL MEDICINE

## 2019-06-04 PROCEDURE — 99215 OFFICE O/P EST HI 40 MIN: CPT | Mod: 25 | Performed by: INTERNAL MEDICINE

## 2019-06-04 RX ORDER — ATORVASTATIN CALCIUM 80 MG/1
80 TABLET, FILM COATED ORAL
Qty: 90 TAB | Refills: 1 | Status: SHIPPED | OUTPATIENT
Start: 2019-06-04 | End: 2019-07-18

## 2019-06-04 RX ORDER — EMPAGLIFLOZIN AND LINAGLIPTIN 10; 5 MG/1; MG/1
1 TABLET, FILM COATED ORAL DAILY
COMMUNITY
Start: 2019-04-08 | End: 2019-06-04 | Stop reason: CLARIF

## 2019-06-04 RX ORDER — LISINOPRIL 10 MG/1
10 TABLET ORAL
Qty: 90 TAB | Refills: 1 | Status: SHIPPED | OUTPATIENT
Start: 2019-06-04 | End: 2019-12-05 | Stop reason: SDUPTHER

## 2019-06-04 NOTE — PROGRESS NOTES
Chief Complaint   Patient presents with   • Diabetes   With DM RN     HPI  Chino Diaz is a 43 y.o. male who presents today for the following     DIABETES MELLITUS TYPE 2  Interval course  -The patient responded extremely well to change of medications    Onset/D  Diabetes education:  Y     Medications:   • Metformin:  1000 mg BID  • Jardiance 25 mg QD  • Trulicity 0.75 mg weekly  • ACE/ARB: lisinopril  • Statin: simvastatin 20 mg QD  • ASA: Y  Compliant with medications: yes  Checking feet daily/wear soft socks/shoes: advised     Diabetes ABCDE TARGETS  • A1c, last:   5.6, was 9.0  • Fingersticks:  yes  o 84-1 35  • Hypoglycemia:  No  • Blood Pressure, goal < 130/80: yes  • Cholesterol-Lipid Panel: uncontrolled, referred to vascular medicine  • Dysalbuminuria:  neg     Diet: decreased carbs  Exercise:  N  BMI: 26     DM complications:  • Peripheral neuropathy:            No numbness or tingling in feet.  • Retinopathy:                            Last eye exam: 3/2019.   • Nephropathy:                           No CAD.  GI:                                                    No gastropathy.    FH of DM: sister     Hypertension  Meds: lisinopril, 10 mg daily; taking as prescribed.   He is not measuring BP at home.  Denies:  -  headaches, vision problems, tinnitus.                 -  chest pain/pressure, palpitations, irregular heart beats, exertional, dyspnea, peripheral edema.  Low salt diet: N  Diet / exercise / BMI: as above.   FH of HTN: father     Hyperlipidemia /extremely high triglycerides  Improved.  Statin: Atorvastatin 80 mg daily, fenofibrate 160 mg daily daily, taking as prescribed. No muscle weakness, cramps, nausea,abdominal discomfort.   Diet / exercise / BMI: as above.   FH: mother  He has been followed up by vascular medicine.    Reviewed PMH, PSH, FH, SH, ALL, HCM/IMM, no changes  Reviewed MEDS, no changes    Patient Active Problem List    Diagnosis Date Noted   • Strabismus  02/06/2019   • IGTN (ingrowing toe nail) 01/02/2018   • Controlled type 2 diabetes mellitus without complication, without long-term current use of insulin (HCC) 12/26/2017   • Essential hypertension 12/26/2017   • Dyslipidemia 12/26/2017   • Health care maintenance 12/26/2017     CURRENT MEDICATIONS  Current Outpatient Prescriptions   Medication Sig Dispense Refill   • Dulaglutide (TRULICITY) 0.75 MG/0.5ML Solution Pen-injector Inject 0.75 mg as instructed every 7 days. 12 PEN 3   • atorvastatin (LIPITOR) 80 MG tablet Take 1 Tab by mouth every day. N THE EVENING 90 Tab 1   • lisinopril (PRINIVIL) 10 MG Tab Take 1 Tab by mouth every day. 90 Tab 1   • vitamin D (CHOLECALCIFEROL) 1000 UNIT Tab Take 1,000 Units by mouth every day.     • omega-3 acid ethyl esters (LOVAZA) 1 GM capsule Take 2 Caps by mouth 2 Times a Day. 120 Cap 11   • Empagliflozin (JARDIANCE) 25 MG Tab Take 25 mg by mouth every day. 90 Tab 0   • Blood Glucose Test Strips TeTest strips for PATIENT meter. Sig: use DAILY and prn ssx high or low sugar #100 RF x 3 1 Package 3   • fenofibrate (TRIGLIDE) 160 MG tablet Take 1 Tab by mouth every day. 90 Tab 3   • aspirin EC (ECOTRIN) 81 MG Tablet Delayed Response Take 1 Tab by mouth every day. 100 Tab 11     No current facility-administered medications for this visit.      ALLERGIES  Allergies: Patient has no known allergies.  PAST MEDICAL HISTORY  Past Medical History:   Diagnosis Date   • Depression    • Hyperlipidemia    • Hypertension    • Type II or unspecified type diabetes mellitus without mention of complication, not stated as uncontrolled      SURGICAL HISTORY  He  has a past surgical history that includes tonsillectomy.  SOCIAL HISTORY  Social History   Substance Use Topics   • Smoking status: Former Smoker     Types: Cigarettes     Quit date: 1/1/2006   • Smokeless tobacco: Never Used   • Alcohol use No     Social History     Social History Narrative   • No narrative on file     FAMILY HISTORY  Family  "History   Problem Relation Age of Onset   • Hyperlipidemia Mother         high trigs   • Cancer Father         lymphoma   • Diabetes Sister    • No Known Problems Brother    • Hyperlipidemia Maternal Grandfather         high trigs   • Heart Attack Maternal Uncle 27        mi     Family Status   Relation Status   • Mo Alive   • Fa Alive   • Sis (Not Specified)   • Bro (Not Specified)   • MGFa (Not Specified)   • MUnc (Not Specified)     ROS   Constitutional: Negative for fatigue.  HENT: Negative for congestion, sore throat.  Eyes: Negative for vision problems.   Respiratory: Negative for cough, shortness of breath.  Cardiovascular: Negative for chest pain, palpitations.   Gastrointestinal: Negative for heartburn, nausea, abdominal pain.   Genitourinary: Negative for dysuria.  Musculoskeletal: Negative for significant myalgia, back and joint pain.   Skin: Negative for rash.   Neuro: Negative for dizziness, weakness and headaches.   Endo/Heme/Allergies: Does not bruise/bleed easily.   Psychiatric/Behavioral: Negative for depression.    PHYSICAL EXAM   /74   Pulse 84   Temp 36.7 °C (98 °F)   Ht 1.778 m (5' 10\")   Wt 84.6 kg (186 lb 8.2 oz)   SpO2 96%  Body mass index is 26.76 kg/m².  General:  NAD, well appearing  HEENT:   NC/AT, PERRLA, EOMI, TMs are clear. Oropharyngeal mucosa is pink,  without lesions;  no cervical / supraclavicular  lymphadenopathy, no thyromegaly.    Cardiovascular: RRR.   No m/r/g.       Lungs:   CTAB, no w/r/r, no respiratory distress.  Abdomen: Soft, NT/ND; no hepatosplenomegaly.  Extremities:  2+ DP and radial pulses bilaterally.  No c/c/e.   Skin:  Warm, dry.  No erythema. No rash.   Neurologic: Alert & oriented x 3. CN II-XII grossly intact. No focal deficits.  Psychiatric:  Affect normal, mood normal, judgment normal.    Labs     Labs are reviewed and discussed with a patient  Lab Results   Component Value Date/Time    CHOLSTRLTOT 92 (L) 05/22/2019 07:44 AM    LDL 0 05/22/2019 " 07:44 AM    HDL 19 (L) 05/22/2019 07:44 AM    TRIGLYCERIDE 364 (H) 05/22/2019 07:44 AM       Lab Results   Component Value Date/Time    SODIUM 138 05/22/2019 07:44 AM    SODIUM 136 06/04/2017 12:14 PM    POTASSIUM 4.2 05/22/2019 07:44 AM    POTASSIUM 4.9 06/04/2017 12:14 PM    CHLORIDE 104 05/22/2019 07:44 AM    CHLORIDE 103 06/04/2017 12:14 PM    CO2 19 (L) 05/22/2019 07:44 AM    CO2 11 (L) 06/04/2017 12:14 PM    GLUCOSE 99 05/22/2019 07:44 AM    GLUCOSE 364 (H) 06/04/2017 12:14 PM    BUN 18 05/22/2019 07:44 AM    BUN 18 06/04/2017 12:14 PM    CREATININE 0.90 05/22/2019 07:44 AM    CREATININE 1.10 06/04/2017 12:14 PM    BUNCREATRAT 20 05/22/2019 07:44 AM     Lab Results   Component Value Date/Time    ALKPHOSPHAT 33 (L) 05/22/2019 07:44 AM    ALKPHOSPHAT 112 (H) 06/04/2017 12:14 PM    ASTSGOT 19 05/22/2019 07:44 AM    ASTSGOT 13 06/04/2017 12:14 PM    ALTSGPT 32 05/22/2019 07:44 AM    ALTSGPT 28 06/04/2017 12:14 PM    TBILIRUBIN 0.5 05/22/2019 07:44 AM    TBILIRUBIN 0.6 06/04/2017 12:14 PM      Lab Results   Component Value Date/Time    HBA1C 5.6 05/22/2019 07:44 AM    HBA1C 9.0 (H) 02/01/2019 09:11 AM    HBA1C 7.6 (H) 10/26/2018 09:59 AM     No results found for: TSH  No results found for: FREET4    Lab Results   Component Value Date/Time    WBC 14.9 (H) 06/04/2017 12:14 PM    RBC 5.88 06/04/2017 12:14 PM    HEMOGLOBIN 18.3 (H) 06/04/2017 12:14 PM    HEMATOCRIT 53.8 (H) 06/04/2017 12:14 PM    MCV 91.5 06/04/2017 12:14 PM    MCH 31.1 06/04/2017 12:14 PM    MCHC 34.0 06/04/2017 12:14 PM    MPV 9.3 06/04/2017 12:14 PM    NEUTSPOLYS 82.80 (H) 06/04/2017 12:14 PM    LYMPHOCYTES 10.70 (L) 06/04/2017 12:14 PM    MONOCYTES 4.00 06/04/2017 12:14 PM    EOSINOPHILS 0.10 06/04/2017 12:14 PM    BASOPHILS 1.00 06/04/2017 12:14 PM      Imaging     None    Assessment and Plan     Chino Diaz is a 43 y.o. male    1. Controlled type 2 diabetes mellitus without complication, without long-term current use of insulin  ([facility])  With the weight loss and change of medications, he responded very wel    Discussed and educated on:   - All medications, side effects and compliance (discussed carefully)  - Annual eye examinations at Ophthalmology  - Diabetic Meal Plan: foods that contain carbs and plate method  - Foot Care: what to look for when checking feet every day and when to contact HCP  - HbA1C: target and what A1C is  - Home glucose monitoring emphasized  - Interpretation of Lab Results  - Long term diabetic complications  - Testing Blood Glucose: when to test, recording blood sugars, target range for FSBS and when to contact HCP  - Weight control and daily exercise     Recommended medication changes: OK to stop metformin and cut Jardiance in half, send FSBG in 2 weeks to DM RN    - Comp Metabolic Panel; Future  - HEMOGLOBIN A1C; Future    2. Essential hypertension  Controlled, continue current treatment  - lisinopril (PRINIVIL) 10 MG Tab; Take 1 Tab by mouth every day.  Dispense: 90 Tab; Refill: 1  - Comp Metabolic Panel; Future    3. Dyslipidemia  Improved, continue current treatment and vascular medicine follow-up  - atorvastatin (LIPITOR) 80 MG tablet; Take 1 Tab by mouth every day. N THE EVENING  Dispense: 90 Tab; Refill: 1  - Comp Metabolic Panel; Future  - Lipid Profile; Future    4. Health care maintenance  5. Need for 23-polyvalent pneumococcal polysaccharide vaccine  - Pneumococal Polysaccharide Vaccine 23-Valent =>3YO SQ/IM  Information was provided to the patient regarding the vaccine, including side effects. Vaccine was given by my medical assistant under my supervision.    Counseling:   - Smoking:  Nonsmoker    Followup: Return in about 4 months (around 10/4/2019) for DM, Labs.    All questions are answered.    Time spent 40 minutes face to face, with > 50% spent counseling and coordinating care. With DM RN.    Please note that this dictation was created using voice recognition software, and that there might be errors  of titus and possibly content.

## 2019-06-04 NOTE — PROGRESS NOTES
RN-BHAVINE Note    Subjective:     Health changes since last visit/interval Hx: tolerating Trulicity well, tolerating Jardiance well    Medications (including changes made today)  Current Outpatient Prescriptions   Medication Sig Dispense Refill   • atorvastatin (LIPITOR) 80 MG tablet TAKE 1 TABLET BY MOUTH EVERY DAY IN THE EVENING 90 Tab 1   • vitamin D (CHOLECALCIFEROL) 1000 UNIT Tab Take 1,000 Units by mouth every day.     • omega-3 acid ethyl esters (LOVAZA) 1 GM capsule Take 2 Caps by mouth 2 Times a Day. 120 Cap 11   • TRULICITY 0.75 MG/0.5ML Solution Pen-injector INJECT 0.5 ML AS INSTRUCTED EVERY 7 DAYS. 2 PEN 1   • Empagliflozin (JARDIANCE) 25 MG Tab Take 25 mg by mouth every day. 90 Tab 0   • Blood Glucose Test Strips TeTest strips for PATIENT meter. Sig: use DAILY and prn ssx high or low sugar #100 RF x 3 1 Package 3   • lisinopril (PRINIVIL) 10 MG Tab Take 1 Tab by mouth every day. 90 Tab 0   • metformin (GLUCOPHAGE) 1000 MG tablet Take 1 Tab by mouth 2 times a day, with meals. 180 Tab 0   • fenofibrate (TRIGLIDE) 160 MG tablet Take 1 Tab by mouth every day. 90 Tab 3   • aspirin EC (ECOTRIN) 81 MG Tablet Delayed Response Take 1 Tab by mouth every day. 100 Tab 11     No current facility-administered medications for this visit.        Taking daily ASA: Yes  Taking above medications as prescribed: yes  SIDE EFFECTS: Patient denies side effects to medications    Exercise: sporadic irregular exercise, <half hour walking weekly  Diet: eating less  Patient's body mass index is 26.76 kg/m². Exercise and nutrition counseling were performed at this visit.      Health Maintenance:   Health Maintenance Due   Topic Date Due   • IMM HEP B VACCINE (1 of 3 - Risk 3-dose series) 12/17/1992   • IMM PNEUMOCOCCAL 19-64 (ADULT) MEDIUM RISK SERIES (1 of 1 - PPSV23) 12/17/1992       Immunizations:   PPSV23: Due  [id number]: Up-to-date  Tdap: Up-to-date  Flu: Up-to-date  Hep B: Declined    DM:   Last A1c:   Lab Results   Component  Value Date/Time    HBA1C 5.6 05/22/2019 07:44 AM      A1C GOAL: < 7    Glucose monitoring frequency: few times weekly    Hypoglycemic episodes: no    Last Retinal Exam: on file and up-to-date  Daily Foot Exam: Yes blister on right foot from hiking in [place]- healed  Routine Dental Exams: Yes    Lab Results   Component Value Date/Time    MICRALB <3.0 05/22/2019 07:44 AM        ACR Albumin/Creatinine Ratio goal <30     HTN:   Blood pressure goal <140/<80 yes.   Currently Rx ACE/ARB: Not Indicated    Dyslipidemia:    Lab Results   Component Value Date/Time    CHOLSTRLTOT 92 (L) 05/22/2019 07:44 AM    LDL 0 05/22/2019 07:44 AM    HDL 19 (L) 05/22/2019 07:44 AM    TRIGLYCERIDE 364 (H) 05/22/2019 07:44 AM       Lab Results   Component Value Date/Time    SODIUM 138 05/22/2019 07:44 AM    SODIUM 136 06/04/2017 12:14 PM    POTASSIUM 4.2 05/22/2019 07:44 AM    POTASSIUM 4.9 06/04/2017 12:14 PM    CHLORIDE 104 05/22/2019 07:44 AM    CHLORIDE 103 06/04/2017 12:14 PM    CO2 19 (L) 05/22/2019 07:44 AM    CO2 11 (L) 06/04/2017 12:14 PM    GLUCOSE 99 05/22/2019 07:44 AM    GLUCOSE 364 (H) 06/04/2017 12:14 PM    BUN 18 05/22/2019 07:44 AM    BUN 18 06/04/2017 12:14 PM    CREATININE 0.90 05/22/2019 07:44 AM    CREATININE 1.10 06/04/2017 12:14 PM    BUNCREATRAT 20 05/22/2019 07:44 AM     Lab Results   Component Value Date/Time    ALKPHOSPHAT 33 (L) 05/22/2019 07:44 AM    ALKPHOSPHAT 112 (H) 06/04/2017 12:14 PM    ASTSGOT 19 05/22/2019 07:44 AM    ASTSGOT 13 06/04/2017 12:14 PM    ALTSGPT 32 05/22/2019 07:44 AM    ALTSGPT 28 06/04/2017 12:14 PM    TBILIRUBIN 0.5 05/22/2019 07:44 AM    TBILIRUBIN 0.6 06/04/2017 12:14 PM        Currently Rx Statin: Yes    He  reports that he quit smoking about 13 years ago. His smoking use included Cigarettes. He has never used smokeless tobacco.    Objective:     Exam:  Monofilament: not done    Plan:     Discussed and educated on:   - All medications, side effects and compliance (discussed  carefully)  - Annual eye examinations at Ophthalmology  - Diabetic Meal Plan: foods that contain carbs and plate method  - Foot Care: what to look for when checking feet every day and when to contact HCP  - HbA1C: target and what A1C is  - Home glucose monitoring emphasized  - Interpretation of Lab Results  - Long term diabetic complications  - Testing Blood Glucose: when to test, recording blood sugars, target range for FSBS and when to contact HCP  - Weight control and daily exercise    Recommended medication changes: OK to stop metformin and cut Jardiance in half, send FSBG in 2 weeks

## 2019-06-20 ENCOUNTER — APPOINTMENT (OUTPATIENT)
Dept: VASCULAR LAB | Facility: MEDICAL CENTER | Age: 44
End: 2019-06-20
Attending: FAMILY MEDICINE
Payer: COMMERCIAL

## 2019-06-20 ENCOUNTER — TELEPHONE (OUTPATIENT)
Dept: VASCULAR LAB | Facility: MEDICAL CENTER | Age: 44
End: 2019-06-20

## 2019-06-24 RX ORDER — EMPAGLIFLOZIN 25 MG/1
25 TABLET, FILM COATED ORAL DAILY
Qty: 90 TAB | Refills: 0 | Status: SHIPPED | OUTPATIENT
Start: 2019-06-24 | End: 2019-07-18

## 2019-07-18 ENCOUNTER — OFFICE VISIT (OUTPATIENT)
Dept: VASCULAR LAB | Facility: MEDICAL CENTER | Age: 44
End: 2019-07-18
Attending: INTERNAL MEDICINE
Payer: COMMERCIAL

## 2019-07-18 VITALS
WEIGHT: 190 LBS | HEART RATE: 76 BPM | SYSTOLIC BLOOD PRESSURE: 117 MMHG | BODY MASS INDEX: 27.2 KG/M2 | HEIGHT: 70 IN | DIASTOLIC BLOOD PRESSURE: 64 MMHG

## 2019-07-18 DIAGNOSIS — I10 ESSENTIAL HYPERTENSION: ICD-10-CM

## 2019-07-18 DIAGNOSIS — E78.5 DYSLIPIDEMIA: ICD-10-CM

## 2019-07-18 PROCEDURE — 99214 OFFICE O/P EST MOD 30 MIN: CPT | Performed by: INTERNAL MEDICINE

## 2019-07-18 RX ORDER — ATORVASTATIN CALCIUM 20 MG/1
20 TABLET, FILM COATED ORAL DAILY
Qty: 30 TAB | Refills: 11 | Status: SHIPPED | OUTPATIENT
Start: 2019-07-18 | End: 2020-02-19 | Stop reason: SDUPTHER

## 2019-07-18 ASSESSMENT — ENCOUNTER SYMPTOMS
FALLS: 0
SENSORY CHANGE: 0
HEADACHES: 0
DEPRESSION: 0
DIZZINESS: 0
WEIGHT LOSS: 0
SPEECH CHANGE: 0
FOCAL WEAKNESS: 0
MYALGIAS: 0
PALPITATIONS: 0
NERVOUS/ANXIOUS: 0
COUGH: 1
SHORTNESS OF BREATH: 0

## 2019-07-18 NOTE — PROGRESS NOTES
"  Follow Up VASCULAR VISIT  Subjective:   Chino Diaz is a 43 y.o. male who presents today 7/19/2019 for   Chief Complaint   Patient presents with   • Follow-Up     HPI:  Here for f/u of hypertriglyceridemia in setting of dm and hypertension  Missed previous f/u appt.   Off jardiance and metformin  On trulicity monotherapy  -150  Started lovaza  Remains on high dose atorva and fenofibrate  No myalgias  Mild cough  Doesn't take bp at home  No cv complaints    Social History   Substance Use Topics   • Smoking status: Former Smoker     Types: Cigarettes     Quit date: 1/1/2006   • Smokeless tobacco: Never Used   • Alcohol use No     DIET AND EXERCISE:  Weight Change: up and down  Diet: much improved dm diet - met with dietician  Exercise: limited    Review of Systems   Constitutional: Negative for malaise/fatigue and weight loss.   Respiratory: Positive for cough. Negative for shortness of breath.    Cardiovascular: Negative for chest pain, palpitations and leg swelling.   Musculoskeletal: Negative for falls and myalgias.   Neurological: Negative for dizziness, sensory change, speech change, focal weakness and headaches.   Psychiatric/Behavioral: Negative for depression. The patient is not nervous/anxious.          Objective:     Vitals:    07/18/19 1414   BP: 117/64   BP Location: Left arm   Patient Position: Sitting   BP Cuff Size: Adult   Pulse: 76   Weight: 86.2 kg (190 lb)   Height: 1.778 m (5' 10\")      Body mass index is 27.26 kg/m².  Physical Exam   Constitutional: He is oriented to person, place, and time. No distress.   Cardiovascular: Normal rate, regular rhythm, normal heart sounds and intact distal pulses.    No murmur heard.  Pulmonary/Chest: Effort normal and breath sounds normal. No respiratory distress. He has no wheezes. He has no rales.   Musculoskeletal: Normal range of motion. He exhibits no edema.   Neurological: He is alert and oriented to person, place, and time. No cranial nerve " deficit. Coordination normal.   Skin: He is not diaphoretic.   Psychiatric: He has a normal mood and affect. His behavior is normal.   Vitals reviewed.    Lab Results   Component Value Date    CHOLSTRLTOT 92 (L) 05/22/2019    LDL 0 05/22/2019    HDL 19 (L) 05/22/2019    TRIGLYCERIDE 364 (H) 05/22/2019    LPIRSCORE 40 05/22/2019         Lab Results   Component Value Date    HBA1C 5.6 05/22/2019      Lab Results   Component Value Date    SODIUM 138 05/22/2019    POTASSIUM 4.2 05/22/2019    CHLORIDE 104 05/22/2019    CO2 19 (L) 05/22/2019    GLUCOSE 99 05/22/2019    BUN 18 05/22/2019    CREATININE 0.90 05/22/2019    BUNCREATRAT 20 05/22/2019    IFA[place] 119 05/22/2019    IFNOTAFR 103 05/22/2019            Medical Decision Making:  Today's Assessment / Status / Plan:     1. Dyslipidemia  Lipid Profile    LDL, DIRECT    Comp Metabolic Panel   2. Essential hypertension  Comp Metabolic Panel   3. Uncontrolled type 2 diabetes mellitus without complication, without long-term current use of insulin (HCC)  Comp Metabolic Panel     Patient Type: Primary Prevention    Etiology of Established CVD if Present: None but does have 5/5 components of metabolic syndrome at baseline    Lipid Management: Qualifies for Statin Therapy Based on 2013 ACC/AHA Guidelines: yes  Calculated 10-Year Risk of ASCVD: N/A  Currently on Statin: Yes  At baseline Patient with marked hypertriglyceridemia putting him at risk for pancreatitis -baseline triglycerides greater than 3000   No previous history of ASCVD or pancreatitis  Initial oor glycemic control likely leading to down-regulation of lipoprotein lipase  Has concomitant low HDL and likely small dense LDL particle  Trigs much improved (now <400)  LDL -C, small ldl-p, ldl-p, and apoB all low  Plan:  -continue Aggressive lifestyle modification as per below  - continue More aggressive glycemic control  -Continue fenofibrate  - decrease atorva to 20 mg daily  - continue lovaza for now  -Repeat fasting  lipid panel with triglycerides direct LDL, prior to next visit    Blood Pressure Management:  ACC-aHA goal less than 130/80  Appears under reasonable control both in the office   Possible aceI cough but patient wishes to continue  -Continue lisinopril  -Encouraged home blood pressure monitoring    Glycemic Status:   Goal A1c less than 7  Much improved control based on FS and most recent a1c despite decrease in meds  Fingersticks are now much improved  -Continue Trulicity  -Continue yearly flu shot and yearly eye doctor  - defer further pharmacotherapy adjustment to pcp    Anti-Platelet/Anti-Coagulant Tx: yes  -Continue low-dose aspirin for now, but if lipids remain so well controlled can consider d/c in future    Smoking: Continue complete avoidance    Physical Activity: Increase exercise to daily    Weight Management and Nutrition: per dietician recommendations    Instructed to follow-up with PCP for remainder of adult medical needs: yes  We will partner with other providers in the management of established vascular disease and cardiometabolic risk factors.    Studies to Be Obtained: None  Labs to Be Obtained: As above prior to next visit    Follow up in: November 2019    Michael J Bloch, M.D.     Cc: TIM Howard

## 2019-08-19 ENCOUNTER — TELEPHONE (OUTPATIENT)
Dept: HEALTH INFORMATION MANAGEMENT | Facility: MEDICAL CENTER | Age: 44
End: 2019-08-19

## 2019-08-19 NOTE — TELEPHONE ENCOUNTER
Patient reports his FSBG have been high since stopping metformin and Jardiance.  Advised to add metformin 500 mg with dinner and 1/2 Jardiance tablet before breakfast, then send FSBG for further adjustment

## 2019-08-30 ENCOUNTER — TELEPHONE (OUTPATIENT)
Dept: HEALTH INFORMATION MANAGEMENT | Facility: MEDICAL CENTER | Age: 44
End: 2019-08-30

## 2019-08-31 NOTE — TELEPHONE ENCOUNTER
Chino sends email: Just wanted to let you know I never got the Jardiance.  Call [facility] and between them thinking I already had some and insurance issues,  going to have to wait some more. Blood sugar average last 7 days had been 285, with me taking the whole Metformin day and night.  Half didn't get my sugars below 350.    He is currently taking metformin 1000 mg twice daily and Trulicity 0.75 mg weekly

## 2019-09-04 ENCOUNTER — TELEPHONE (OUTPATIENT)
Dept: MEDICAL GROUP | Facility: MEDICAL CENTER | Age: 44
End: 2019-09-04

## 2019-09-04 ENCOUNTER — TELEPHONE (OUTPATIENT)
Dept: HEALTH INFORMATION MANAGEMENT | Facility: MEDICAL CENTER | Age: 44
End: 2019-09-04

## 2019-09-04 DIAGNOSIS — E11.9 CONTROLLED TYPE 2 DIABETES MELLITUS WITHOUT COMPLICATION, WITHOUT LONG-TERM CURRENT USE OF INSULIN (HCC): ICD-10-CM

## 2019-09-04 NOTE — TELEPHONE ENCOUNTER
DOCUMENTATION OF PAR STATUS:    1. Name of Medication & Dose: Empagliflozin (JARDIANCE) 25 MG Tab      2. Name of Prescription Coverage Company & phone #: [employer]    3. Date Prior Auth Submitted: 09/04/2019    4. What information was given to obtain insurance decision? In Chart    5. Prior Auth Status? Pending    6. Patient Notified: yes

## 2019-09-04 NOTE — TELEPHONE ENCOUNTER
FINAL PRIOR AUTHORIZATION STATUS:    1.  Name of Medication & Dose: Empagliflozin (JARDIANCE) 25 MG Tab      2. Prior Auth Status: Denied.  Reason: No covered my insurance    3. Action Taken: Pharmacy Notified: yes Patient Notified: yes

## 2019-09-04 NOTE — TELEPHONE ENCOUNTER
Empagliflozin (JARDIANCE) 25 MG Tab is not covered by patient insurance, please send over METFORMIN HCL TABS 1000MG for patient as this medication is covered by insurance.     Thank you.

## 2019-09-04 NOTE — TELEPHONE ENCOUNTER
MEDICATION PRIOR AUTHORIZATION NEEDED:    1. Name of Medication: Empagliflozin (JARDIANCE) 25 MG Tab     2. Requested By (Name of Pharmacy): [facility]      3. Is insurance on file current? Yes    4. What is the name & phone number of the 3rd party payor? [employer]

## 2019-09-04 NOTE — TELEPHONE ENCOUNTER
I called for preauthorzation of Jardiance as Chino is already taking metformin 1000 mg twice daily.  ([phone]).  Liana prior-auth went through with $15 copay per pharmacy, but Trulicity requires a new PA

## 2019-09-17 ENCOUNTER — TELEPHONE (OUTPATIENT)
Dept: HEALTH INFORMATION MANAGEMENT | Facility: MEDICAL CENTER | Age: 44
End: 2019-09-17

## 2019-09-17 NOTE — TELEPHONE ENCOUNTER
Chino emails FSBG results after starting Jardiance and increasing Trulicity dose:    9/5 5am-279 605pm-144 - started Jardiance and increased Trulicity 1.5 mg    9/6 630am-177 805pm-130    9/7 435am 150 511pm-119    9/8 506am-151 513pm-113    9/9 505am-142 446pm-111    9/10 503am-150 519pm-108    9/11 549am-122 625pm-137    9/12 600am-137 607pm-119    9/13 603am-125    No changes recommended

## 2019-09-27 ENCOUNTER — TELEPHONE (OUTPATIENT)
Dept: VASCULAR LAB | Facility: MEDICAL CENTER | Age: 44
End: 2019-09-27

## 2019-09-27 LAB
ALBUMIN SERPL-MCNC: 4.9 G/DL (ref 3.5–5.5)
ALBUMIN SERPL-MCNC: 4.9 G/DL (ref 3.5–5.5)
ALBUMIN/GLOB SERPL: 2.1 {RATIO} (ref 1.2–2.2)
ALBUMIN/GLOB SERPL: 2.2 {RATIO} (ref 1.2–2.2)
ALP SERPL-CCNC: 39 IU/L (ref 39–117)
ALP SERPL-CCNC: 40 IU/L (ref 39–117)
ALT SERPL-CCNC: 25 IU/L (ref 0–44)
ALT SERPL-CCNC: 28 IU/L (ref 0–44)
AST SERPL-CCNC: 17 IU/L (ref 0–40)
AST SERPL-CCNC: 19 IU/L (ref 0–40)
BILIRUB SERPL-MCNC: 0.5 MG/DL (ref 0–1.2)
BILIRUB SERPL-MCNC: 0.6 MG/DL (ref 0–1.2)
BUN SERPL-MCNC: 16 MG/DL (ref 6–24)
BUN SERPL-MCNC: 16 MG/DL (ref 6–24)
BUN/CREAT SERPL: 18 (ref 9–20)
BUN/CREAT SERPL: 19 (ref 9–20)
CALCIUM SERPL-MCNC: 10.1 MG/DL (ref 8.7–10.2)
CALCIUM SERPL-MCNC: 9.9 MG/DL (ref 8.7–10.2)
CHLORIDE SERPL-SCNC: 100 MMOL/L (ref 96–106)
CHLORIDE SERPL-SCNC: 101 MMOL/L (ref 96–106)
CHOLEST SERPL-MCNC: 159 MG/DL (ref 100–199)
CHOLEST SERPL-MCNC: 161 MG/DL (ref 100–199)
CO2 SERPL-SCNC: 17 MMOL/L (ref 20–29)
CO2 SERPL-SCNC: 17 MMOL/L (ref 20–29)
CREAT SERPL-MCNC: 0.83 MG/DL (ref 0.76–1.27)
CREAT SERPL-MCNC: 0.9 MG/DL (ref 0.76–1.27)
GLOBULIN SER CALC-MCNC: 2.2 G/DL (ref 1.5–4.5)
GLOBULIN SER CALC-MCNC: 2.3 G/DL (ref 1.5–4.5)
GLUCOSE SERPL-MCNC: 113 MG/DL (ref 65–99)
GLUCOSE SERPL-MCNC: 114 MG/DL (ref 65–99)
HBA1C MFR BLD: 7.1 % (ref 4.8–5.6)
HDLC SERPL-MCNC: 19 MG/DL
HDLC SERPL-MCNC: 19 MG/DL
LABORATORY COMMENT REPORT: ABNORMAL
LABORATORY COMMENT REPORT: ABNORMAL
LDLC SERPL CALC-MCNC: ABNORMAL MG/DL (ref 0–99)
LDLC SERPL CALC-MCNC: ABNORMAL MG/DL (ref 0–99)
LDLC SERPL DIRECT ASSAY-MCNC: 30 MG/DL (ref 0–99)
POTASSIUM SERPL-SCNC: 4.4 MMOL/L (ref 3.5–5.2)
POTASSIUM SERPL-SCNC: 4.6 MMOL/L (ref 3.5–5.2)
PROT SERPL-MCNC: 7.1 G/DL (ref 6–8.5)
PROT SERPL-MCNC: 7.2 G/DL (ref 6–8.5)
SODIUM SERPL-SCNC: 136 MMOL/L (ref 134–144)
SODIUM SERPL-SCNC: 136 MMOL/L (ref 134–144)
TRIGL SERPL-MCNC: 967 MG/DL (ref 0–149)
TRIGL SERPL-MCNC: 974 MG/DL (ref 0–149)
VLDLC SERPL CALC-MCNC: ABNORMAL MG/DL (ref 5–40)
VLDLC SERPL CALC-MCNC: ABNORMAL MG/DL (ref 5–40)

## 2019-09-27 NOTE — TELEPHONE ENCOUNTER
Received lab results showing critical TG of 967 from today.  Pt does have a significant hx of severely elevated TG >3,000.    LM for pt, verifying he is taking meds as prescribed by Dr. Bloch.  Also urged to seek immediate care for nausea/vomiting/ and abdominal pain.    Will f/u with pt on Monday.      MARCELO King  [facility]

## 2019-09-30 ENCOUNTER — TELEPHONE (OUTPATIENT)
Dept: VASCULAR LAB | Facility: MEDICAL CENTER | Age: 44
End: 2019-09-30

## 2019-09-30 NOTE — TELEPHONE ENCOUNTER
Called pt regarding elevated Trig levels. Unable to speak with the pt. LM on VM to call me back on my direct line regarding his levels, if he is taking the medications that Dr. Bloch put him on and if he has any symptoms of pancreatitis. Encouraged if symptoms to go to the ER. LETY Goldman.

## 2019-10-01 ENCOUNTER — TELEPHONE (OUTPATIENT)
Dept: VASCULAR LAB | Facility: MEDICAL CENTER | Age: 44
End: 2019-10-01

## 2019-10-04 ENCOUNTER — OFFICE VISIT (OUTPATIENT)
Dept: MEDICAL GROUP | Facility: MEDICAL CENTER | Age: 44
End: 2019-10-04
Payer: COMMERCIAL

## 2019-10-04 VITALS
BODY MASS INDEX: 27.58 KG/M2 | OXYGEN SATURATION: 97 % | HEIGHT: 70 IN | WEIGHT: 192.68 LBS | DIASTOLIC BLOOD PRESSURE: 60 MMHG | TEMPERATURE: 97.8 F | SYSTOLIC BLOOD PRESSURE: 120 MMHG | HEART RATE: 99 BPM

## 2019-10-04 DIAGNOSIS — Z23 NEEDS FLU SHOT: ICD-10-CM

## 2019-10-04 DIAGNOSIS — H50.9 STRABISMUS: ICD-10-CM

## 2019-10-04 DIAGNOSIS — E11.9 CONTROLLED TYPE 2 DIABETES MELLITUS WITHOUT COMPLICATION, WITHOUT LONG-TERM CURRENT USE OF INSULIN (HCC): Primary | ICD-10-CM

## 2019-10-04 DIAGNOSIS — I10 ESSENTIAL HYPERTENSION: ICD-10-CM

## 2019-10-04 DIAGNOSIS — E78.5 DYSLIPIDEMIA: ICD-10-CM

## 2019-10-04 DIAGNOSIS — Z00.00 HEALTH CARE MAINTENANCE: ICD-10-CM

## 2019-10-04 PROCEDURE — 90686 IIV4 VACC NO PRSV 0.5 ML IM: CPT | Performed by: INTERNAL MEDICINE

## 2019-10-04 PROCEDURE — 99214 OFFICE O/P EST MOD 30 MIN: CPT | Mod: 25 | Performed by: INTERNAL MEDICINE

## 2019-10-04 PROCEDURE — 90471 IMMUNIZATION ADMIN: CPT | Performed by: INTERNAL MEDICINE

## 2019-10-04 NOTE — PROGRESS NOTES
CHIEF COMPLAINT  Chief Complaint   Patient presents with   • Follow-Up     4 months    Diabetes     HPI  Chino Diaz is a 43 y.o. male who presents today for the following     DIABETES MELLITUS TYPE 2  Interval course  -did not have med supply due to insurance reasons  -Restarted Jardiance and Trulicity 1 month ago only  -In between, fasting blood glucose dropped from 300 to 150  -He has had close telephone and email follow-up with diabetes nurse     Onset/D  Diabetes education:  Y     Medications:   • Metformin:  1000 mg BID  • Jardiance 25 mg QD  • Trulicity 1.5 mg weekly  • ACE/ARB: lisinopril  • Statin: simvastatin 20 mg QD  • ASA: Y  Compliant with medications: yes  Checking feet daily/wear soft socks/shoes: advised     Diabetes ABCDE TARGETS  • A1c, last:   5.6, was 9.0  • Fingersticks:  yes  o 84-1 35  • Hypoglycemia:  No  • Blood Pressure, goal < 130/80: yes  • Cholesterol-Lipid Panel: uncontrolled, referred to vascular medicine  • Dysalbuminuria:  neg     Diet: decreased carbs  Exercise:  N  BMI: 26     DM complications:  • Peripheral neuropathy:   No numbness or tingling in feet.  • Retinopathy:                     Last eye exam: 3/2019.   • Nephropathy:                No CAD.  GI:                                               No gastropathy.     FH of DM: sister     Hypertension  Meds: lisinopril, 10 mg daily; taking as prescribed.   He is not measuring BP at home.  Denies:  -  headaches, vision problems, tinnitus.                 -  chest pain/pressure, palpitations, irregular heart beats, exertional, dyspnea, peripheral edema.  Low salt diet: N  Diet / exercise / BMI: as above.   FH of HTN: father     Hyperlipidemia /extremely high triglycerides  Improved, follow-up by vascular medicine.  Statin: Atorvastatin 80 mg daily, fenofibrate 160 mg daily daily, taking as prescribed. No muscle weakness, cramps, nausea,abdominal discomfort.   Diet / exercise / BMI: as above.   FH:  mother    Reviewed PMH, PSH, FH, SH, ALL, HCM/IMM, no changes  Reviewed MEDS, no changes    Patient Active Problem List    Diagnosis Date Noted   • Strabismus 02/06/2019   • Controlled type 2 diabetes mellitus without complication, without long-term current use of insulin (HCC) 12/26/2017   • Essential hypertension 12/26/2017   • Dyslipidemia 12/26/2017   • Health care maintenance 12/26/2017     CURRENT MEDICATIONS  Current Outpatient Medications   Medication Sig Dispense Refill   • Blood Glucose Test Strips TeTest strips for PATIENT meter. Sig: use DAILY and prn ssx high or low sugar #100 RF x 3 1 Package 11   • glucose blood strip 1 Strip by Other route as needed (Accu-check Fast Click laments). 20 Strip 11   • metformin (GLUCOPHAGE) 1000 MG tablet TAKE 1 TABLET BY MOUTH TWICE A DAY WITH MEALS 180 Tab 0   • Dulaglutide (TRULICITY) 1.5 MG/0.5ML Solution Pen-injector Inject 1.5 mg as instructed every 7 days. 12 PEN 3   • Empagliflozin (JARDIANCE) 25 MG Tab Take 25 mg by mouth every morning before breakfast. 30 Tab 11   • atorvastatin (LIPITOR) 20 MG Tab Take 1 Tab by mouth every day. 30 Tab 11   • lisinopril (PRINIVIL) 10 MG Tab Take 1 Tab by mouth every day. 90 Tab 1   • vitamin D (CHOLECALCIFEROL) 1000 UNIT Tab Take 1,000 Units by mouth every day.     • omega-3 acid ethyl esters (LOVAZA) 1 GM capsule Take 2 Caps by mouth 2 Times a Day. 120 Cap 11   • fenofibrate (TRIGLIDE) 160 MG tablet Take 1 Tab by mouth every day. 90 Tab 3   • aspirin EC (ECOTRIN) 81 MG Tablet Delayed Response Take 1 Tab by mouth every day. 100 Tab 11     No current facility-administered medications for this visit.      ALLERGIES  Allergies: Patient has no known allergies.  PAST MEDICAL HISTORY  Past Medical History:   Diagnosis Date   • Depression    • Hyperlipidemia    • Hypertension    • Type II or unspecified type diabetes mellitus without mention of complication, not stated as uncontrolled      SURGICAL HISTORY  He  has a past surgical  "history that includes tonsillectomy.  SOCIAL HISTORY  Social History     Tobacco Use   • Smoking status: Former Smoker     Types: Cigarettes     Last attempt to quit: 2006     Years since quittin.7   • Smokeless tobacco: Never Used   Substance Use Topics   • Alcohol use: No   • Drug use: No     Social History     Social History Narrative   • Not on file     FAMILY HISTORY  Family History   Problem Relation Age of Onset   • Hyperlipidemia Mother         high trigs   • Cancer Father         lymphoma   • Diabetes Sister    • No Known Problems Brother    • Hyperlipidemia Maternal Grandfather         high trigs   • Heart Attack Maternal Uncle 27        mi     Family Status   Relation Name Status   • Mo  Alive   • Fa  Alive   • Sis  (Not Specified)   • Bro  (Not Specified)   • MGFa  (Not Specified)   • MUnc  (Not Specified)     ROS   Constitutional: Negative for fever, chills, fatigue.  HENT: Negative for congestion, sore throat.  Eyes: He has strabismus.   Respiratory: Negative for cough, shortness of breath.  Cardiovascular: Negative for chest pain, palpitations.   Gastrointestinal: Negative for heartburn, nausea, abdominal pain.   Genitourinary: Negative for polyuria.  Musculoskeletal: Negative for back and joint pain.   Skin: Negative for rash.   Neuro: Negative for dizziness, weakness and headaches.   Endo/Heme/Allergies: Does not bruise/bleed easily.   Psychiatric/Behavioral: Negative for depression.    PHYSICAL EXAM   Blood Pressure 120/60   Pulse 99   Temperature 36.6 °C (97.8 °F) (Temporal)   Height 1.778 m (5' 10\")   Weight 87.4 kg (192 lb 10.9 oz)   Oxygen Saturation 97%  Body mass index is 27.65 kg/m².  General:  NAD, well appearing  HEENT:   NC/AT, PERRLA, EOMI, TMs are clear. Oropharyngeal mucosa is pink,  without lesions;  no cervical / supraclavicular  lymphadenopathy, no thyromegaly.    Cardiovascular: RRR.   No m/r/g.       Lungs:   CTAB, no w/r/r, no respiratory distress.  Abdomen: Soft, " NT/ND; no hepatosplenomegaly.  Extremities:  2+ DP and radial pulses bilaterally.  No c/c/e.   Skin:  Warm, dry.  No erythema. No rash.   Neurologic: Alert & oriented x 3. CN II-XII grossly intact. No focal deficits.  Psychiatric:  Affect normal, mood normal, judgment normal.    Labs     Labs are reviewed and discussed with a patient  Lab Results   Component Value Date/Time    CHOLSTRLTOT 161 09/26/2019 04:40 AM    CHOLSTRLTOT 159 09/26/2019 04:40 AM    LDL Comment 09/26/2019 04:40 AM    LDL Comment 09/26/2019 04:40 AM    HDL 19 (L) 09/26/2019 04:40 AM    HDL 19 (L) 09/26/2019 04:40 AM    TRIGLYCERIDE 967 (HH) 09/26/2019 04:40 AM    TRIGLYCERIDE 974 (HH) 09/26/2019 04:40 AM       Lab Results   Component Value Date/Time    SODIUM 136 09/26/2019 04:40 AM    SODIUM 136 09/26/2019 04:40 AM    SODIUM 136 06/04/2017 12:14 PM    POTASSIUM 4.6 09/26/2019 04:40 AM    POTASSIUM 4.4 09/26/2019 04:40 AM    POTASSIUM 4.9 06/04/2017 12:14 PM    CHLORIDE 100 09/26/2019 04:40 AM    CHLORIDE 101 09/26/2019 04:40 AM    CHLORIDE 103 06/04/2017 12:14 PM    CO2 17 (L) 09/26/2019 04:40 AM    CO2 17 (L) 09/26/2019 04:40 AM    CO2 11 (L) 06/04/2017 12:14 PM    GLUCOSE 114 (H) 09/26/2019 04:40 AM    GLUCOSE 113 (H) 09/26/2019 04:40 AM    GLUCOSE 364 (H) 06/04/2017 12:14 PM    BUN 16 09/26/2019 04:40 AM    BUN 16 09/26/2019 04:40 AM    BUN 18 06/04/2017 12:14 PM    CREATININE 0.90 09/26/2019 04:40 AM    CREATININE 0.83 09/26/2019 04:40 AM    CREATININE 1.10 06/04/2017 12:14 PM    BUNCREATRAT 18 09/26/2019 04:40 AM    BUNCREATRAT 19 09/26/2019 04:40 AM     Lab Results   Component Value Date/Time    ALKPHOSPHAT 40 09/26/2019 04:40 AM    ALKPHOSPHAT 39 09/26/2019 04:40 AM    ALKPHOSPHAT 112 (H) 06/04/2017 12:14 PM    ASTSGOT 19 09/26/2019 04:40 AM    ASTSGOT 17 09/26/2019 04:40 AM    ASTSGOT 13 06/04/2017 12:14 PM    ALTSGPT 28 09/26/2019 04:40 AM    ALTSGPT 25 09/26/2019 04:40 AM    ALTSGPT 28 06/04/2017 12:14 PM    TBILIRUBIN 0.6 09/26/2019  04:40 AM    TBILIRUBIN 0.5 09/26/2019 04:40 AM    TBILIRUBIN 0.6 06/04/2017 12:14 PM      Lab Results   Component Value Date/Time    HBA1C 7.1 (H) 09/26/2019 04:40 AM    HBA1C 5.6 05/22/2019 07:44 AM    HBA1C 9.0 (H) 02/01/2019 09:11 AM     No results found for: TSH  No results found for: FREET4    Lab Results   Component Value Date/Time    WBC 14.9 (H) 06/04/2017 12:14 PM    RBC 5.88 06/04/2017 12:14 PM    HEMOGLOBIN 18.3 (H) 06/04/2017 12:14 PM    HEMATOCRIT 53.8 (H) 06/04/2017 12:14 PM    MCV 91.5 06/04/2017 12:14 PM    MCH 31.1 06/04/2017 12:14 PM    MCHC 34.0 06/04/2017 12:14 PM    MPV 9.3 06/04/2017 12:14 PM    NEUTSPOLYS 82.80 (H) 06/04/2017 12:14 PM    LYMPHOCYTES 10.70 (L) 06/04/2017 12:14 PM    MONOCYTES 4.00 06/04/2017 12:14 PM    EOSINOPHILS 0.10 06/04/2017 12:14 PM    BASOPHILS 1.00 06/04/2017 12:14 PM        Imaging     None    Assessment and Plan     Chino Diaz is a 43 y.o. male    1. Controlled type 2 diabetes mellitus without complication, without long-term current use of insulin (HCC)  Improved after he restarted diabetes medications that he will continue  -Advised to increase exercise    - Comp Metabolic Panel; Future  - HEMOGLOBIN A1C; Future  - Lipid Profile; Future  - metformin (GLUCOPHAGE) 1000 MG tablet; TAKE 1 TABLET BY MOUTH TWICE A DAY WITH MEALS  Dispense: 180 Tab; Refill: 0  - Empagliflozin (JARDIANCE) 25 MG Tab; Take 25 mg by mouth every morning before breakfast.  Dispense: 90 Tab; Refill: 2    2. Essential hypertension  Controlled, continue current treatment  - Comp Metabolic Panel; Future    3. Dyslipidemia  Improved on current treatment, continue current treatment and vascular follow-up  - Comp Metabolic Panel; Future    4. Strabismus  Continue ophthalmology follow-up    5. Needs flu shot  - Influenza Vaccine Quad Injection (PF)  Information was provided to the patient regarding the vaccine, including side effects. Vaccine was given by my medical assistant under my  supervision.    6. Health care maintenance  Need hep B    Counseling:   - Smoking:  Nonsmoker    Followup: Return in about 3 months (around 1/4/2020), or if symptoms worsen or fail to improve, for DM-RN, Labs.    All questions are answered.    Please note that this dictation was created using voice recognition software, and that there might be errors of titus and possibly content.

## 2019-11-21 ENCOUNTER — OFFICE VISIT (OUTPATIENT)
Dept: VASCULAR LAB | Facility: MEDICAL CENTER | Age: 44
End: 2019-11-21
Attending: INTERNAL MEDICINE
Payer: COMMERCIAL

## 2019-11-21 VITALS
WEIGHT: 192.6 LBS | DIASTOLIC BLOOD PRESSURE: 72 MMHG | HEIGHT: 70 IN | BODY MASS INDEX: 27.57 KG/M2 | HEART RATE: 74 BPM | SYSTOLIC BLOOD PRESSURE: 134 MMHG

## 2019-11-21 DIAGNOSIS — I10 ESSENTIAL HYPERTENSION: ICD-10-CM

## 2019-11-21 DIAGNOSIS — E78.5 DYSLIPIDEMIA: ICD-10-CM

## 2019-11-21 PROCEDURE — 99212 OFFICE O/P EST SF 10 MIN: CPT

## 2019-11-21 PROCEDURE — 99214 OFFICE O/P EST MOD 30 MIN: CPT | Performed by: INTERNAL MEDICINE

## 2019-11-21 ASSESSMENT — ENCOUNTER SYMPTOMS
COUGH: 0
HEADACHES: 0
DIZZINESS: 0
MYALGIAS: 0
DEPRESSION: 0
SHORTNESS OF BREATH: 0
NERVOUS/ANXIOUS: 0
SENSORY CHANGE: 0
PALPITATIONS: 0
FALLS: 0
WEIGHT LOSS: 0
FOCAL WEAKNESS: 0
SPEECH CHANGE: 0

## 2019-11-21 NOTE — PROGRESS NOTES
"  Follow Up VASCULAR VISIT  Subjective:   Chino Diaz is a 43 y.o. male who presents today 2019 for   Chief Complaint   Patient presents with   • Follow-Up     HPI:  Here for f/u of hypertriglyceridemia in setting of dm and hypertension  Was off trulicity for about 1.5 mo due to formulary issues, back on for a month or so now.   Now back on trulicity, jardiance and metformin.  -150  Remains on high dose atorva and fenofibrate and lovaza  No myalgias  No further cough  Remains on lisinopril  Doesn't take bp at home  No cv complaints  Moving to [place] in feb    Social History     Tobacco Use   • Smoking status: Former Smoker     Types: Cigarettes     Last attempt to quit: 2006     Years since quittin.8   • Smokeless tobacco: Never Used   Substance Use Topics   • Alcohol use: No   • Drug use: No     DIET AND EXERCISE:  Weight Change: up and down  Diet: much improved dm diet - met with dietician  Exercise: limited    Review of Systems   Constitutional: Negative for malaise/fatigue and weight loss.   Respiratory: Negative for cough and shortness of breath.    Cardiovascular: Negative for chest pain, palpitations and leg swelling.   Musculoskeletal: Negative for falls and myalgias.   Neurological: Negative for dizziness, sensory change, speech change, focal weakness and headaches.   Psychiatric/Behavioral: Negative for depression. The patient is not nervous/anxious.          Objective:     Vitals:    19 1013 19 1017   BP: 139/79 134/72   BP Location: Left arm Left arm   Patient Position: Sitting Sitting   BP Cuff Size: Adult Adult   Pulse: 73 74   Weight: 87.4 kg (192 lb 9.6 oz)    Height: 1.77 m (5' 9.69\")       Body mass index is 27.89 kg/m².  Physical Exam   Constitutional: He is oriented to person, place, and time. No distress.   Cardiovascular: Normal rate, regular rhythm, normal heart sounds and intact distal pulses.   No murmur heard.  Pulmonary/Chest: Effort normal and breath " sounds normal. No respiratory distress. He has no wheezes. He has no rales.   Musculoskeletal: Normal range of motion.         General: No edema.   Neurological: He is alert and oriented to person, place, and time. No cranial nerve deficit. Coordination normal.   Skin: He is not diaphoretic.   Psychiatric: He has a normal mood and affect. His behavior is normal.   Vitals reviewed.    Lab Results   Component Value Date    CHOLSTRLTOT 161 09/26/2019    CHOLSTRLTOT 159 09/26/2019    LDL Comment 09/26/2019    LDL Comment 09/26/2019    HDL 19 (L) 09/26/2019    HDL 19 (L) 09/26/2019    TRIGLYCERIDE 967 (HH) 09/26/2019    TRIGLYCERIDE 974 (HH) 09/26/2019    LPIRSCORE 40 05/22/2019         Lab Results   Component Value Date    HBA1C 7.1 (H) 09/26/2019      Lab Results   Component Value Date    SODIUM 136 09/26/2019    SODIUM 136 09/26/2019    POTASSIUM 4.6 09/26/2019    POTASSIUM 4.4 09/26/2019    CHLORIDE 100 09/26/2019    CHLORIDE 101 09/26/2019    CO2 17 (L) 09/26/2019    CO2 17 (L) 09/26/2019    GLUCOSE 114 (H) 09/26/2019    GLUCOSE 113 (H) 09/26/2019    BUN 16 09/26/2019    BUN 16 09/26/2019    CREATININE 0.90 09/26/2019    CREATININE 0.83 09/26/2019    BUNCREATRAT 18 09/26/2019    BUNCREATRAT 19 09/26/2019    IFAFRICA 119 09/26/2019    IFAFRICA 123 09/26/2019    IFNOTAFR 103 09/26/2019    IFNOTAFR 106 09/26/2019            Medical Decision Making:  Today's Assessment / Status / Plan:     1. Dyslipidemia     2. Essential hypertension     3. Uncontrolled type 2 diabetes mellitus without complication, without long-term current use of insulin (HCC)       Patient Type: Primary Prevention    Etiology of Established CVD if Present: None but does have 5/5 components of metabolic syndrome at baseline    Lipid Management: Qualifies for Statin Therapy Based on 2013 ACC/AHA Guidelines: yes  Calculated 10-Year Risk of ASCVD: N/A  Currently on Statin: Yes  At baseline Patient with marked hypertriglyceridemia putting him at risk for  pancreatitis -baseline triglycerides greater than 3000   No previous history of ASCVD or pancreatitis  Initial poor glycemic control likely leading to down-regulation of lipoprotein lipase  Has concomitant low HDL and likely small dense LDL particle  Trigs were much improved but increased on most recent blood work - likely due to episode of poor glycemic control (see below)  LDL -C, small ldl-p, ldl-p, and apoB all low  Plan:  -continue Aggressive lifestyle modification as per below  - continue More aggressive glycemic control  -Continue fenofibrate  - decrease atorva to 20 mg daily  - continue lovaza for now  -Repeat fasting lipid panel with triglycerides direct LDL, prior to next visit    Blood Pressure Management:  ACC-aHA goal less than 130/80  Appears under reasonable control both in the office on previous visits - a bit elevated today  Possible aceI cough but patient wishes to continue  -Continue lisinopril  -Encouraged home blood pressure monitoring    Glycemic Status:   Goal A1c less than 7  Had about 1.5 months of poor control due to inability to get meds - now back on all of them  Fingersticks are now much improved  -Continue Trulicity, metformin and jardiance  -Continue yearly flu shot and yearly eye doctor  - recheck a1c prior to next visit  - defer further pharmacotherapy adjustment to pcp    Anti-Platelet/Anti-Coagulant Tx: yes  -Continue low-dose aspirin for now, but if lipids remain so well controlled can consider d/c in future    Smoking: Continue complete avoidance    Physical Activity: Increase exercise to daily    Weight Management and Nutrition: per dietician recommendations    Instructed to follow-up with PCP for remainder of adult medical needs: yes  We will partner with other providers in the management of established vascular disease and cardiometabolic risk factors.    Studies to Be Obtained: None  Labs to Be Obtained: cmp, a1c, lipid panel prior to next visit    Follow up in: 2 months -  will be last visit as patient moving to [place] in feb 2020    Michael J Bloch, M.D.     Cc: TIM Howard

## 2019-12-05 DIAGNOSIS — I10 ESSENTIAL HYPERTENSION: ICD-10-CM

## 2019-12-05 RX ORDER — LISINOPRIL 10 MG/1
TABLET ORAL
Qty: 90 TAB | Refills: 0 | Status: SHIPPED | OUTPATIENT
Start: 2019-12-05 | End: 2020-02-19 | Stop reason: SDUPTHER

## 2020-02-10 RX ORDER — OMEGA-3-ACID ETHYL ESTERS 1 G/1
2 CAPSULE, LIQUID FILLED ORAL 2 TIMES DAILY
Qty: 360 CAP | Refills: 1 | Status: SHIPPED | OUTPATIENT
Start: 2020-02-10 | End: 2020-07-29

## 2020-02-10 NOTE — TELEPHONE ENCOUNTER
Pt has had OV within the 12 month protocol and lipid panel is current. 6 month supply sent to pharmacy.   Lab Results   Component Value Date/Time    CHOLSTRLTOT 161 09/26/2019 04:40 AM    CHOLSTRLTOT 159 09/26/2019 04:40 AM    LDL Comment 09/26/2019 04:40 AM    LDL Comment 09/26/2019 04:40 AM    HDL 19 (L) 09/26/2019 04:40 AM    HDL 19 (L) 09/26/2019 04:40 AM    TRIGLYCERIDE 967 (HH) 09/26/2019 04:40 AM    TRIGLYCERIDE 974 (HH) 09/26/2019 04:40 AM       Lab Results   Component Value Date/Time    SODIUM 136 09/26/2019 04:40 AM    SODIUM 136 09/26/2019 04:40 AM    SODIUM 136 06/04/2017 12:14 PM    POTASSIUM 4.6 09/26/2019 04:40 AM    POTASSIUM 4.4 09/26/2019 04:40 AM    POTASSIUM 4.9 06/04/2017 12:14 PM    CHLORIDE 100 09/26/2019 04:40 AM    CHLORIDE 101 09/26/2019 04:40 AM    CHLORIDE 103 06/04/2017 12:14 PM    CO2 17 (L) 09/26/2019 04:40 AM    CO2 17 (L) 09/26/2019 04:40 AM    CO2 11 (L) 06/04/2017 12:14 PM    GLUCOSE 114 (H) 09/26/2019 04:40 AM    GLUCOSE 113 (H) 09/26/2019 04:40 AM    GLUCOSE 364 (H) 06/04/2017 12:14 PM    BUN 16 09/26/2019 04:40 AM    BUN 16 09/26/2019 04:40 AM    BUN 18 06/04/2017 12:14 PM    CREATININE 0.90 09/26/2019 04:40 AM    CREATININE 0.83 09/26/2019 04:40 AM    CREATININE 1.10 06/04/2017 12:14 PM    BUNCREATRAT 18 09/26/2019 04:40 AM    BUNCREATRAT 19 09/26/2019 04:40 AM     Lab Results   Component Value Date/Time    ALKPHOSPHAT 40 09/26/2019 04:40 AM    ALKPHOSPHAT 39 09/26/2019 04:40 AM    ALKPHOSPHAT 112 (H) 06/04/2017 12:14 PM    ASTSGOT 19 09/26/2019 04:40 AM    ASTSGOT 17 09/26/2019 04:40 AM    ASTSGOT 13 06/04/2017 12:14 PM    ALTSGPT 28 09/26/2019 04:40 AM    ALTSGPT 25 09/26/2019 04:40 AM    ALTSGPT 28 06/04/2017 12:14 PM    TBILIRUBIN 0.6 09/26/2019 04:40 AM    TBILIRUBIN 0.5 09/26/2019 04:40 AM    TBILIRUBIN 0.6 06/04/2017 12:14 PM        Jose Miguel Jeffries, PharmD, BCACP

## 2020-02-15 LAB
ALBUMIN SERPL-MCNC: 5 G/DL (ref 4–5)
ALBUMIN/GLOB SERPL: 1.9 {RATIO} (ref 1.2–2.2)
ALP SERPL-CCNC: 49 IU/L (ref 39–117)
ALT SERPL-CCNC: 35 IU/L (ref 0–44)
AST SERPL-CCNC: 24 IU/L (ref 0–40)
BILIRUB SERPL-MCNC: 0.3 MG/DL (ref 0–1.2)
BUN SERPL-MCNC: 18 MG/DL (ref 6–24)
BUN/CREAT SERPL: 22 (ref 9–20)
CALCIUM SERPL-MCNC: 10.3 MG/DL (ref 8.7–10.2)
CHLORIDE SERPL-SCNC: 109 MMOL/L (ref 96–106)
CHOLEST SERPL-MCNC: 400 MG/DL (ref 100–199)
CO2 SERPL-SCNC: 17 MMOL/L (ref 20–29)
CREAT SERPL-MCNC: 0.82 MG/DL (ref 0.76–1.27)
GLOBULIN SER CALC-MCNC: 2.6 G/DL (ref 1.5–4.5)
GLUCOSE SERPL-MCNC: 147 MG/DL (ref 65–99)
HBA1C MFR BLD: 6.9 % (ref 4.8–5.6)
HDLC SERPL-MCNC: 9 MG/DL
LABORATORY COMMENT REPORT: ABNORMAL
LDLC SERPL CALC-MCNC: ABNORMAL MG/DL (ref 0–99)
POTASSIUM SERPL-SCNC: 4.8 MMOL/L (ref 3.5–5.2)
PROT SERPL-MCNC: 7.6 G/DL (ref 6–8.5)
SODIUM SERPL-SCNC: 145 MMOL/L (ref 134–144)
TRIGL SERPL-MCNC: 422 MG/DL (ref 0–149)
VLDLC SERPL CALC-MCNC: ABNORMAL MG/DL (ref 5–40)

## 2020-02-17 PROBLEM — E55.9 HYPOVITAMINOSIS D: Status: ACTIVE | Noted: 2020-02-17

## 2020-02-17 PROBLEM — E78.1 HYPERTRIGLYCERIDEMIA: Status: ACTIVE | Noted: 2020-02-17

## 2020-02-17 NOTE — PROGRESS NOTES
CHIEF COMPLAINT  DM2, labs    HPI  Chino Diaz is a 44 y.o. male who presents today for the following     DM 22, controlled  Fatigie  Interval course  -Controlled diabetes  -Complains of fatigue     Onset/D  Diabetes education:  Y     Medications:   • Metformin:  1000 mg BID  • Jardiance 25 mg QD  • Trulicity 1.5 mg weekly  • ACE/ARB: lisinopril  • Statin: simvastatin 20 mg QD  • ASA: Y  Compliant with medications: yes  Checking feet daily/wear soft socks/shoes: advised     Diabetes ABCDE TARGETS  • A1c, last:   6.9, was 7.1  • Fingersticks:  yes  o 84-1 35  • Hypoglycemia:  No  • Blood Pressure, goal < 130/80: yes  • Cholesterol-Lipid Panel: uncontrolled, referred to vascular medicine  • Dysalbuminuria:  neg     Diet: decreased carbs  Exercise:  N  BMI: 28     DM complications:  • Peripheral neuropathy: No numbness or tingling in feet.  • Retinopathy:                       Last eye exam: 3/2019.   • Nephropathy:                           No CAD.  GI:                                        No gastropathy.     FH of DM: sister     Hypertension  Meds: lisinopril, 10 mg daily; taking as prescribed.   He is not measuring BP at home.  Denies:  -  headaches, vision problems, tinnitus.                 -  chest pain/pressure, palpitations, irregular heart beats, exertional, dyspnea, peripheral edema.  Low salt diet: N  Diet / exercise / BMI: as above.   FH of HTN: father     Hyperlipidemia /very high triglycerides  Improved, follow-up by vascular medicine.  Statin: Atorvastatin 80 mg daily, fenofibrate 160 mg daily daily, taking as prescribed. No muscle weakness, cramps, nausea,abdominal discomfort.   Diet / exercise / BMI: as above.   FH: mother     Hypovitaminosis D  The patient had low vitamin D level.  Vitamin D supplement: 100.    Reviewed PMH, PSH, FH, SH, ALL, HCM/IMM, no changes  Reviewed MEDS, no changes    Patient Active Problem List    Diagnosis Date Noted   • Strabismus 2019   • Controlled  type 2 diabetes mellitus without complication, without long-term current use of insulin ([facility]) 12/26/2017   • Essential hypertension 12/26/2017   • Dyslipidemia 12/26/2017   • Health care maintenance 12/26/2017     CURRENT MEDICATIONS  Current Outpatient Medications   Medication Sig Dispense Refill   • omega-3 acid ethyl esters (LOVAZA) 1 GM capsule TAKE 2 CAPS BY MOUTH 2 TIMES A DAY. 360 Cap 1   • lisinopril (PRINIVIL) 10 MG Tab TAKE 1 TABLET BY MOUTH EVERY DAY 90 Tab 0   • metformin (GLUCOPHAGE) 1000 MG tablet TAKE 1 TABLET BY MOUTH TWICE A DAY WITH MEALS 180 Tab 0   • Empagliflozin (JARDIANCE) 25 MG Tab Take 25 mg by mouth every morning before breakfast. 90 Tab 2   • Blood Glucose Test Strips TeTest strips for PATIENT meter. Sig: use DAILY and prn ssx high or low sugar #100 RF x 3 1 Package 11   • glucose blood strip 1 Strip by Other route as needed (Accu-check Fast Click laments). 20 Strip 11   • Dulaglutide (TRULICITY) 1.5 MG/0.5ML Solution Pen-injector Inject 1.5 mg as instructed every 7 days. 12 PEN 3   • atorvastatin (LIPITOR) 20 MG Tab Take 1 Tab by mouth every day. 30 Tab 11   • vitamin D (CHOLECALCIFEROL) 1000 UNIT Tab Take 1,000 Units by mouth every day.     • fenofibrate (TRIGLIDE) 160 MG tablet Take 1 Tab by mouth every day. 90 Tab 3   • aspirin EC (ECOTRIN) 81 MG Tablet Delayed Response Take 1 Tab by mouth every day. 100 Tab 11     No current facility-administered medications for this visit.      ALLERGIES  Allergies: Patient has no known allergies.  PAST MEDICAL HISTORY  Past Medical History:   Diagnosis Date   • Depression    • Hyperlipidemia    • Hypertension    • Type II or unspecified type diabetes mellitus without mention of complication, not stated as uncontrolled      SURGICAL HISTORY  He  has a past surgical history that includes tonsillectomy.  SOCIAL HISTORY  Social History     Tobacco Use   • Smoking status: Former Smoker     Types: Cigarettes     Last attempt to quit: 1/1/2006     Years  "since quittin.1   • Smokeless tobacco: Never Used   Substance Use Topics   • Alcohol use: No   • Drug use: No     Social History     Social History Narrative   • Not on file     FAMILY HISTORY  Family History   Problem Relation Age of Onset   • Hyperlipidemia Mother         high trigs   • Cancer Father         lymphoma   • Diabetes Sister    • No Known Problems Brother    • Hyperlipidemia Maternal Grandfather         high trigs   • Heart Attack Maternal Uncle 27        mi     Family Status   Relation Name Status   • Mo  Alive   • Fa  Alive   • Sis  (Not Specified)   • Bro  (Not Specified)   • MGFa  (Not Specified)   • MUnc  (Not Specified)       ROS   Constitutional: Negative for fever, chills, fatigue.  HENT: Negative for congestion, sore throat.  Eyes: Negative for vision problems.   Respiratory: Negative for cough, shortness of breath.  Cardiovascular: Negative for chest pain, palpitations.   Gastrointestinal: Negative for heartburn, nausea, abdominal pain.   Genitourinary: Negative for dysuria.  Musculoskeletal: Negative for significant myalgia, back and joint pain.   Skin: Negative for rash.   Neuro: Negative for dizziness, weakness and headaches.   Endo/Heme/Allergies: Does not bruise/bleed easily.   Psychiatric/Behavioral: Negative for depression.    PHYSICAL EXAM   Blood Pressure 118/72 (BP Location: Left arm, Patient Position: Sitting, BP Cuff Size: Adult)   Pulse 70   Temperature 36.2 °C (97.1 °F) (Temporal)   Respiration 16   Height 1.753 m (5' 9\")   Weight 88 kg (194 lb 0.1 oz)   Oxygen Saturation 98%   Body Mass Index 28.65 kg/m²   General:  NAD, well appearing  HEENT:   NC/AT, PERRLA, EOMI, TMs are clear. Oropharyngeal mucosa is pink,  without lesions;  no cervical / supraclavicular  lymphadenopathy, no thyromegaly.    Cardiovascular: RRR.   No m/r/g.       Lungs:   CTAB, no w/r/r, no respiratory distress.  Abdomen: Soft, NT/ND; no hepatosplenomegaly.  Extremities:  2+ DP and radial pulses " bilaterally.  No c/c/e.   Skin:  Warm, dry.  No erythema. No rash.   Neurologic: Alert & oriented x 3. CN II-XII grossly intact. No focal deficits.  Psychiatric:  Affect normal, mood normal, judgment normal.    Labs     Labs are reviewed and discussed with a patient  Lab Results   Component Value Date/Time    CHOLSTRLTOT 400 (H) 02/14/2020 06:38 AM    LDL Comment 02/14/2020 06:38 AM    HDL 9 (L) 02/14/2020 06:38 AM    TRIGLYCERIDE 422 (H) 02/14/2020 06:38 AM       Lab Results   Component Value Date/Time    SODIUM 145 (H) 02/14/2020 06:38 AM    SODIUM 136 06/04/2017 12:14 PM    POTASSIUM 4.8 02/14/2020 06:38 AM    POTASSIUM 4.9 06/04/2017 12:14 PM    CHLORIDE 109 (H) 02/14/2020 06:38 AM    CHLORIDE 103 06/04/2017 12:14 PM    CO2 17 (L) 02/14/2020 06:38 AM    CO2 11 (L) 06/04/2017 12:14 PM    GLUCOSE 147 (H) 02/14/2020 06:38 AM    GLUCOSE 364 (H) 06/04/2017 12:14 PM    BUN 18 02/14/2020 06:38 AM    BUN 18 06/04/2017 12:14 PM    CREATININE 0.82 02/14/2020 06:38 AM    CREATININE 1.10 06/04/2017 12:14 PM    BUNCREATRAT 22 (H) 02/14/2020 06:38 AM     Lab Results   Component Value Date/Time    ALKPHOSPHAT 49 02/14/2020 06:38 AM    ALKPHOSPHAT 112 (H) 06/04/2017 12:14 PM    ASTSGOT 24 02/14/2020 06:38 AM    ASTSGOT 13 06/04/2017 12:14 PM    ALTSGPT 35 02/14/2020 06:38 AM    ALTSGPT 28 06/04/2017 12:14 PM    TBILIRUBIN 0.3 02/14/2020 06:38 AM    TBILIRUBIN 0.6 06/04/2017 12:14 PM      Lab Results   Component Value Date/Time    HBA1C 6.9 (H) 02/14/2020 06:38 AM    HBA1C 7.1 (H) 09/26/2019 04:40 AM    HBA1C 5.6 05/22/2019 07:44 AM     No results found for: TSH  No results found for: FREET4    Lab Results   Component Value Date/Time    WBC 14.9 (H) 06/04/2017 12:14 PM    RBC 5.88 06/04/2017 12:14 PM    HEMOGLOBIN 18.3 (H) 06/04/2017 12:14 PM    HEMATOCRIT 53.8 (H) 06/04/2017 12:14 PM    MCV 91.5 06/04/2017 12:14 PM    MCH 31.1 06/04/2017 12:14 PM    MCHC 34.0 06/04/2017 12:14 PM    MPV 9.3 06/04/2017 12:14 PM    NEUTSPOLYS  82.80 (H) 06/04/2017 12:14 PM    LYMPHOCYTES 10.70 (L) 06/04/2017 12:14 PM    MONOCYTES 4.00 06/04/2017 12:14 PM    EOSINOPHILS 0.10 06/04/2017 12:14 PM    BASOPHILS 1.00 06/04/2017 12:14 PM      Imaging     None    Assessment and Plan     Chino Diaz is a 44 y.o. male    1. Controlled type 2 diabetes mellitus without complication, without long-term current use of insulin (HCC)  Controlled, continue current treatment  - Comp Metabolic Panel; Future  - HEMOGLOBIN A1C; Future  - MICROALBUMIN CREAT RATIO URINE; Future  - metformin (GLUCOPHAGE) 1000 MG tablet; TAKE 1 TABLET BY MOUTH TWICE A DAY WITH MEALS  Dispense: 180 Tab; Refill: 0  - Empagliflozin (JARDIANCE) 25 MG Tab; Take 25 mg by mouth every morning before breakfast.  Dispense: 90 Tab; Refill: 2  - Blood Glucose Test Strips; For pt glucometer; QD  Dispense: 100 Strip; Refill: 11    2. Fatigue, unspecified type  Follow-up labs  - CBC WITH DIFFERENTIAL; Future  - CBC WITH DIFFERENTIAL; Future  - TSH; Future    3. Essential hypertension  Controlled, continue current treatment  - Comp Metabolic Panel; Future  - lisinopril (PRINIVIL) 10 MG Tab; Take 1 Tab by mouth every day.  Dispense: 90 Tab; Refill: 1    4. Dyslipidemia  Improved, continue current treatment  - Comp Metabolic Panel; Future  - Lipid Profile; Future  - fenofibrate (TRIGLIDE) 160 MG tablet; Take 1 Tab by mouth every day.  Dispense: 90 Tab; Refill: 1  - fenofibrate (TRIGLIDE) 160 MG tablet; Take 1 Tab by mouth every day.  Dispense: 90 Tab; Refill: 3    5. Hypertriglyceridemia  As well #4  - Comp Metabolic Panel; Future    6. Hypovitaminosis D  Advised 2000 units vitamin D daily  - VITAMIN D,25 HYDROXY; Future    7. Health care maintenance  8. Need for vaccination  Due hep B    Counseling:   - Smoking:  Nonsmoker    Followup: Pt is moving to [facility] 2 weeks    All questions are answered.    Please note that this dictation was created using voice recognition software, and that there might be  errors of titus and possibly content.

## 2020-02-19 ENCOUNTER — OFFICE VISIT (OUTPATIENT)
Dept: MEDICAL GROUP | Facility: MEDICAL CENTER | Age: 45
End: 2020-02-19
Payer: COMMERCIAL

## 2020-02-19 VITALS
DIASTOLIC BLOOD PRESSURE: 72 MMHG | HEIGHT: 69 IN | SYSTOLIC BLOOD PRESSURE: 118 MMHG | RESPIRATION RATE: 16 BRPM | OXYGEN SATURATION: 98 % | WEIGHT: 194 LBS | BODY MASS INDEX: 28.73 KG/M2 | TEMPERATURE: 97.1 F | HEART RATE: 70 BPM

## 2020-02-19 DIAGNOSIS — E11.9 CONTROLLED TYPE 2 DIABETES MELLITUS WITHOUT COMPLICATION, WITHOUT LONG-TERM CURRENT USE OF INSULIN (HCC): ICD-10-CM

## 2020-02-19 DIAGNOSIS — E78.1 HYPERTRIGLYCERIDEMIA: ICD-10-CM

## 2020-02-19 DIAGNOSIS — Z23 NEED FOR VACCINATION: ICD-10-CM

## 2020-02-19 DIAGNOSIS — E55.9 HYPOVITAMINOSIS D: ICD-10-CM

## 2020-02-19 DIAGNOSIS — Z00.00 HEALTH CARE MAINTENANCE: ICD-10-CM

## 2020-02-19 DIAGNOSIS — R53.83 FATIGUE, UNSPECIFIED TYPE: ICD-10-CM

## 2020-02-19 DIAGNOSIS — E78.5 DYSLIPIDEMIA: ICD-10-CM

## 2020-02-19 DIAGNOSIS — I10 ESSENTIAL HYPERTENSION: ICD-10-CM

## 2020-02-19 PROCEDURE — 99214 OFFICE O/P EST MOD 30 MIN: CPT | Performed by: INTERNAL MEDICINE

## 2020-02-19 RX ORDER — FENOFIBRATE 160 MG/1
160 TABLET ORAL DAILY
Qty: 90 TAB | Refills: 1 | Status: SHIPPED | OUTPATIENT
Start: 2020-02-19 | End: 2020-07-29

## 2020-02-19 RX ORDER — EMPAGLIFLOZIN 25 MG/1
25 TABLET, FILM COATED ORAL
Qty: 90 TAB | Refills: 2 | Status: SHIPPED | OUTPATIENT
Start: 2020-02-19

## 2020-02-19 RX ORDER — LISINOPRIL 10 MG/1
10 TABLET ORAL
Qty: 90 TAB | Refills: 1 | Status: SHIPPED | OUTPATIENT
Start: 2020-02-19 | End: 2020-08-03 | Stop reason: SDUPTHER

## 2020-02-19 RX ORDER — FENOFIBRATE 160 MG/1
160 TABLET ORAL DAILY
Qty: 90 TAB | Refills: 3 | Status: SHIPPED | OUTPATIENT
Start: 2020-02-19

## 2020-02-19 RX ORDER — ATORVASTATIN CALCIUM 20 MG/1
20 TABLET, FILM COATED ORAL DAILY
Qty: 90 TAB | Refills: 1 | Status: SHIPPED | OUTPATIENT
Start: 2020-02-19

## 2020-02-19 ASSESSMENT — PATIENT HEALTH QUESTIONNAIRE - PHQ9: CLINICAL INTERPRETATION OF PHQ2 SCORE: 0

## 2020-02-24 DIAGNOSIS — E11.9 CONTROLLED TYPE 2 DIABETES MELLITUS WITHOUT COMPLICATION, WITHOUT LONG-TERM CURRENT USE OF INSULIN (HCC): ICD-10-CM

## 2020-07-28 DIAGNOSIS — E78.5 DYSLIPIDEMIA: ICD-10-CM

## 2020-07-29 RX ORDER — OMEGA-3-ACID ETHYL ESTERS 1 G/1
2 CAPSULE, LIQUID FILLED ORAL 2 TIMES DAILY
Qty: 120 CAP | Refills: 0 | Status: SHIPPED | OUTPATIENT
Start: 2020-07-29 | End: 2020-08-26

## 2020-08-03 DIAGNOSIS — I10 ESSENTIAL HYPERTENSION: ICD-10-CM

## 2020-08-03 RX ORDER — LISINOPRIL 10 MG/1
10 TABLET ORAL
Qty: 30 TAB | Refills: 0 | Status: SHIPPED | OUTPATIENT
Start: 2020-08-03

## 2020-08-26 DIAGNOSIS — E78.5 DYSLIPIDEMIA: ICD-10-CM

## 2020-08-26 RX ORDER — OMEGA-3-ACID ETHYL ESTERS 1 G/1
CAPSULE, LIQUID FILLED ORAL
Qty: 120 CAP | Refills: 0 | Status: SHIPPED | OUTPATIENT
Start: 2020-08-26 | End: 2020-09-21

## 2020-09-02 DIAGNOSIS — I10 ESSENTIAL HYPERTENSION: ICD-10-CM

## 2020-09-02 RX ORDER — LISINOPRIL 10 MG/1
TABLET ORAL
Qty: 30 TAB | Refills: 0 | OUTPATIENT
Start: 2020-09-02

## 2020-09-02 NOTE — TELEPHONE ENCOUNTER
Phone Number Called: [phone] (home)     Call outcome: Did not leave a detailed message. Requested patient to call back.    Message: 1st attempt

## 2020-09-03 NOTE — TELEPHONE ENCOUNTER
Phone Number Called: [phone] (home)     Call outcome: Did not leave a detailed message. Requested patient to call back.    Message: 2nd attempt

## 2020-09-04 NOTE — TELEPHONE ENCOUNTER
Phone Number Called: [phone] (home)     Call outcome: Did not leave a detailed message. Requested patient to call back.    Message: 3rd attempt sent my chart message

## 2020-10-18 DIAGNOSIS — E78.5 DYSLIPIDEMIA: ICD-10-CM

## 2020-10-19 RX ORDER — OMEGA-3-ACID ETHYL ESTERS 1 G/1
CAPSULE, LIQUID FILLED ORAL
Qty: 120 CAP | Refills: 0 | OUTPATIENT
Start: 2020-10-19

## 2020-10-19 RX ORDER — DULAGLUTIDE 1.5 MG/.5ML
INJECTION, SOLUTION SUBCUTANEOUS
Refills: 0 | OUTPATIENT
Start: 2020-10-19

## 2020-10-21 RX ORDER — DULAGLUTIDE 1.5 MG/.5ML
INJECTION, SOLUTION SUBCUTANEOUS
Refills: 0 | OUTPATIENT
Start: 2020-10-21

## 2020-11-02 RX ORDER — ATORVASTATIN CALCIUM 20 MG/1
20 TABLET, FILM COATED ORAL DAILY
Qty: 90 TAB | Refills: 0 | OUTPATIENT
Start: 2020-11-02
